# Patient Record
Sex: MALE | Race: WHITE | Employment: UNEMPLOYED | URBAN - METROPOLITAN AREA
[De-identification: names, ages, dates, MRNs, and addresses within clinical notes are randomized per-mention and may not be internally consistent; named-entity substitution may affect disease eponyms.]

---

## 2017-03-30 DIAGNOSIS — E78.5 HYPERLIPIDEMIA LDL GOAL <130: ICD-10-CM

## 2017-03-30 RX ORDER — SIMVASTATIN 40 MG
20 TABLET ORAL AT BEDTIME
Qty: 15 TABLET | Refills: 0 | Status: SHIPPED | OUTPATIENT
Start: 2017-03-30 | End: 2017-04-05

## 2017-04-03 DIAGNOSIS — E78.5 HYPERLIPIDEMIA LDL GOAL <130: ICD-10-CM

## 2017-04-04 RX ORDER — SIMVASTATIN 40 MG
TABLET ORAL
Qty: 45 TABLET | Refills: 3 | OUTPATIENT
Start: 2017-04-04

## 2017-04-05 DIAGNOSIS — E78.5 HYPERLIPIDEMIA LDL GOAL <130: ICD-10-CM

## 2017-04-05 NOTE — TELEPHONE ENCOUNTER
Please resend, [facility] states they have no current RX on file or no new one.    Thank you    Soila Hernandez MA

## 2017-04-06 RX ORDER — SIMVASTATIN 40 MG
20 TABLET ORAL AT BEDTIME
Qty: 15 TABLET | Refills: 0 | Status: SHIPPED | OUTPATIENT
Start: 2017-04-06 | End: 2017-05-04

## 2017-05-02 DIAGNOSIS — E78.5 HYPERLIPIDEMIA LDL GOAL <130: ICD-10-CM

## 2017-05-02 RX ORDER — SIMVASTATIN 40 MG
20 TABLET ORAL AT BEDTIME
Qty: 15 TABLET | Refills: 0 | Status: CANCELLED | OUTPATIENT
Start: 2017-05-02

## 2017-05-02 NOTE — TELEPHONE ENCOUNTER
Greater than 1 year since last office visit and labs for lipids. Last refill given for one month. No pending apts. To provider. Sarah Calderon RN

## 2017-05-03 DIAGNOSIS — I10 HYPERTENSION GOAL BP (BLOOD PRESSURE) < 140/90: Primary | ICD-10-CM

## 2017-05-03 DIAGNOSIS — E78.5 HYPERLIPIDEMIA LDL GOAL <130: ICD-10-CM

## 2017-05-03 NOTE — TELEPHONE ENCOUNTER
Reason for Call:  Medication or medication refill:    Do you use a [facility]?  Name of the pharmacy and phone number for the current request:  [facility]    Name of the medication requested: simvastatin (ZOCOR) 40 MG tablet    Other request: Patient stated that there was a Posmetric[employer] message sent to patient but patient is unable to check mychart. If there is something that patient needs to do please call to discuss.  Thank you    Can we leave a detailed message on this number? YES    Phone number patient can be reached at: Home number on file [phone] (home)    Best Time: ANYTIME    Call taken on 5/3/2017 at 4:38 PM by Nemesio Corbin

## 2017-05-04 RX ORDER — SIMVASTATIN 40 MG
TABLET ORAL
OUTPATIENT
Start: 2017-05-04

## 2017-05-04 RX ORDER — METOPROLOL SUCCINATE 50 MG/1
50 TABLET, EXTENDED RELEASE ORAL DAILY
Qty: 30 TABLET | Refills: 0 | Status: SHIPPED | OUTPATIENT
Start: 2017-05-04 | End: 2017-05-25

## 2017-05-04 RX ORDER — SIMVASTATIN 40 MG
20 TABLET ORAL AT BEDTIME
Qty: 15 TABLET | Refills: 0 | Status: SHIPPED | OUTPATIENT
Start: 2017-05-04 | End: 2017-05-25

## 2017-05-04 NOTE — TELEPHONE ENCOUNTER
Sreedhar Rich MD     3:25 PM  zocor denied. Need to be seen Sreedhar Rich MD    TC, please assist patient with appointment.     Daisha Monzon RN

## 2017-05-04 NOTE — TELEPHONE ENCOUNTER
Called and spoke to patient's wife. Lab and provider appointment made. Can you please refill until his appt on 5/25/17.    Please review lab orders and sign. Ching Welch MA/MARCY    Please alvaor for TC again so I can inform if RX is approved.

## 2017-05-13 DIAGNOSIS — I10 HYPERTENSION GOAL BP (BLOOD PRESSURE) < 140/90: ICD-10-CM

## 2017-05-13 DIAGNOSIS — E78.5 HYPERLIPIDEMIA LDL GOAL <130: ICD-10-CM

## 2017-05-13 PROCEDURE — 80061 LIPID PANEL: CPT | Performed by: FAMILY MEDICINE

## 2017-05-13 PROCEDURE — 36415 COLL VENOUS BLD VENIPUNCTURE: CPT | Performed by: FAMILY MEDICINE

## 2017-05-13 PROCEDURE — 80053 COMPREHEN METABOLIC PANEL: CPT | Performed by: FAMILY MEDICINE

## 2017-05-15 LAB
ALBUMIN SERPL-MCNC: 4 G/DL (ref 3.4–5)
ALP SERPL-CCNC: 77 U/L (ref 40–150)
ALT SERPL W P-5'-P-CCNC: 50 U/L (ref 0–70)
ANION GAP SERPL CALCULATED.3IONS-SCNC: 11 MMOL/L (ref 3–14)
AST SERPL W P-5'-P-CCNC: 30 U/L (ref 0–45)
BILIRUB SERPL-MCNC: 0.5 MG/DL (ref 0.2–1.3)
BUN SERPL-MCNC: 12 MG/DL (ref 7–30)
CALCIUM SERPL-MCNC: 8.8 MG/DL (ref 8.5–10.1)
CHLORIDE SERPL-SCNC: 105 MMOL/L (ref 94–109)
CHOLEST SERPL-MCNC: 207 MG/DL
CO2 SERPL-SCNC: 23 MMOL/L (ref 20–32)
CREAT SERPL-MCNC: 0.78 MG/DL (ref 0.66–1.25)
GFR SERPL CREATININE-BSD FRML MDRD: NORMAL ML/MIN/1.7M2
GLUCOSE SERPL-MCNC: 97 MG/DL (ref 70–99)
HDLC SERPL-MCNC: 45 MG/DL
LDLC SERPL CALC-MCNC: 102 MG/DL
NONHDLC SERPL-MCNC: 162 MG/DL
POTASSIUM SERPL-SCNC: 4.1 MMOL/L (ref 3.4–5.3)
PROT SERPL-MCNC: 7.8 G/DL (ref 6.8–8.8)
SODIUM SERPL-SCNC: 139 MMOL/L (ref 133–144)
TRIGL SERPL-MCNC: 301 MG/DL

## 2017-05-25 ENCOUNTER — OFFICE VISIT (OUTPATIENT)
Dept: FAMILY MEDICINE | Facility: CLINIC | Age: 52
End: 2017-05-25
Payer: COMMERCIAL

## 2017-05-25 VITALS
TEMPERATURE: 98 F | DIASTOLIC BLOOD PRESSURE: 89 MMHG | SYSTOLIC BLOOD PRESSURE: 139 MMHG | HEIGHT: 68 IN | WEIGHT: 247 LBS | HEART RATE: 83 BPM | BODY MASS INDEX: 37.44 KG/M2 | OXYGEN SATURATION: 96 %

## 2017-05-25 DIAGNOSIS — E78.5 HYPERLIPIDEMIA LDL GOAL <130: ICD-10-CM

## 2017-05-25 DIAGNOSIS — I10 HYPERTENSION GOAL BP (BLOOD PRESSURE) < 140/90: ICD-10-CM

## 2017-05-25 PROCEDURE — 99214 OFFICE O/P EST MOD 30 MIN: CPT | Performed by: FAMILY MEDICINE

## 2017-05-25 RX ORDER — SIMVASTATIN 40 MG
20 TABLET ORAL AT BEDTIME
Qty: 45 TABLET | Refills: 3 | Status: SHIPPED | OUTPATIENT
Start: 2017-05-25 | End: 2018-06-04

## 2017-05-25 RX ORDER — METOPROLOL SUCCINATE 50 MG/1
50 TABLET, EXTENDED RELEASE ORAL DAILY
Qty: 90 TABLET | Refills: 3 | Status: SHIPPED | OUTPATIENT
Start: 2017-05-25 | End: 2018-06-25
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
## 2017-05-25 NOTE — MR AVS SNAPSHOT
"              After Visit Summary   5/25/2017    Reece Washington    MRN: [id number]           Patient Information     Date Of Birth          1965        Visit Information        Provider Department      5/25/2017 7:20 AM Sreedhar Rich MD [facility]        Today's Diagnoses     Hyperlipidemia LDL goal <130        Hypertension goal BP (blood pressure) < 140/90           Follow-ups after your visit        Who to contact     If you have questions or need follow up information about today's clinic visit or your schedule please contact [facility] directly at [phone].  Normal or non-critical lab and imaging results will be communicated to you by MyChart, letter or phone within 4 business days after the clinic has received the results. If you do not hear from us within 7 days, please contact the clinic through MyChart or phone. If you have a critical or abnormal lab result, we will notify you by phone as soon as possible.  Submit refill requests through [employer] or call your pharmacy and they will forward the refill request to us. Please allow 3 business days for your refill to be completed.          Additional Information About Your Visit        Care EveryWhere ID     This is your Care EveryWhere ID. This could be used by other organizations to access your [place] medical records  [id number]        Your Vitals Were     Pulse Temperature Height Pulse Oximetry BMI (Body Mass Index)       83 98  F (36.7  C) (Oral) 5' 8\" (1.727 m) 96% 37.56 kg/m2        Blood Pressure from Last 3 Encounters:   05/25/17 139/89   06/21/16 135/83   01/26/16 137/89    Weight from Last 3 Encounters:   05/25/17 247 lb (112 kg)   06/21/16 249 lb (112.9 kg)   01/26/16 250 lb (113.4 kg)              Today, you had the following     No orders found for display         Today's Medication Changes          These changes are accurate as of: 5/25/17  7:33 AM.  If you have any questions, ask your nurse or doctor.    "            These medicines have changed or have updated prescriptions.        Dose/Directions    simvastatin 40 MG tablet   Commonly known as:  ZOCOR   This may have changed:  additional instructions   Used for:  Hyperlipidemia LDL goal <130   Changed by:  Sreedhar Rich MD        Dose:  20 mg   Take 0.5 tablets (20 mg) by mouth At Bedtime For cholesterol   Quantity:  45 tablet   Refills:  3            Where to get your medicines      These medications were sent to [id number] - [place], [place] - [address] [facility]  [address] [facility], [place] [facility] [place] [facility] [place] [address]    Hours:  test fax successful 4/5/04 kr Phone:  [phone]     metoprolol 50 MG 24 hr tablet    simvastatin 40 MG tablet                Primary Care Provider Office Phone # Fax #    Sreedhar Rich [phone]       [facility] [address] [facility] [address]        Thank you!     Thank you for choosing [facility]  for your care. Our goal is always to provide you with excellent care. Hearing back from our patients is one way we can continue to improve our services. Please take a few minutes to complete the written survey that you may receive in the mail after your visit with us. Thank you!             Your Updated Medication List - Protect others around you: Learn how to safely use, store and throw away your medicines at www.disposemymeds.org.          This list is accurate as of: 5/25/17  7:33 AM.  Always use your most recent med list.                   Brand Name Dispense Instructions for use    aspirin 81 MG tablet     1 tablet    Take 1 tablet (81 mg) by mouth daily       fish oil-omega-3 fatty acids 1000 MG capsule      Take 2 g by mouth daily.       metoprolol 50 MG 24 hr tablet    TOPROL-XL    90 tablet    Take 1 tablet (50 mg) by mouth daily Take in AM for blood pressure       MULTIVITAMIN TABS   OR      1 TABLET DAILY       propylene glycol 0.6 % Soln ophthalmic solution    SYSTANE  BALANCE     Apply 1 drop to eye 3 times daily.       simvastatin 40 MG tablet    ZOCOR    45 tablet    Take 0.5 tablets (20 mg) by mouth At Bedtime For cholesterol

## 2017-05-25 NOTE — NURSING NOTE
"Chief Complaint   Patient presents with     Lipids       Initial /89  Pulse 83  Temp 98  F (36.7  C) (Oral)  Ht 5' 8\" (1.727 m)  Wt 247 lb (112 kg)  SpO2 96%  BMI 37.56 kg/m2 Estimated body mass index is 37.56 kg/(m^2) as calculated from the following:    Height as of this encounter: 5' 8\" (1.727 m).    Weight as of this encounter: 247 lb (112 kg).  Medication Reconciliation: complete   Natasha Johns, YEVGENIY    "

## 2017-07-03 ENCOUNTER — TELEPHONE (OUTPATIENT)
Dept: FAMILY MEDICINE | Facility: CLINIC | Age: 52
End: 2017-07-03

## 2017-07-03 ENCOUNTER — RADIANT APPOINTMENT (OUTPATIENT)
Dept: GENERAL RADIOLOGY | Facility: CLINIC | Age: 52
End: 2017-07-03
Attending: FAMILY MEDICINE
Payer: COMMERCIAL

## 2017-07-03 ENCOUNTER — OFFICE VISIT (OUTPATIENT)
Dept: FAMILY MEDICINE | Facility: CLINIC | Age: 52
End: 2017-07-03
Payer: COMMERCIAL

## 2017-07-03 VITALS
BODY MASS INDEX: 36.95 KG/M2 | HEART RATE: 96 BPM | TEMPERATURE: 98.7 F | WEIGHT: 243 LBS | DIASTOLIC BLOOD PRESSURE: 77 MMHG | SYSTOLIC BLOOD PRESSURE: 133 MMHG | OXYGEN SATURATION: 96 %

## 2017-07-03 DIAGNOSIS — R10.13 EPIGASTRIC PAIN: Primary | ICD-10-CM

## 2017-07-03 DIAGNOSIS — R10.13 EPIGASTRIC PAIN: ICD-10-CM

## 2017-07-03 DIAGNOSIS — K21.00 GASTROESOPHAGEAL REFLUX DISEASE WITH ESOPHAGITIS: ICD-10-CM

## 2017-07-03 PROCEDURE — 71020 XR CHEST 2 VW: CPT

## 2017-07-03 PROCEDURE — 93000 ELECTROCARDIOGRAM COMPLETE: CPT | Performed by: FAMILY MEDICINE

## 2017-07-03 PROCEDURE — 99214 OFFICE O/P EST MOD 30 MIN: CPT | Performed by: FAMILY MEDICINE

## 2017-07-03 NOTE — TELEPHONE ENCOUNTER
Discussed with Dr. Rich who advises ok for pt to take once or twice daily as needed for heartburn.  Pharmacy notified.  Natasha Way RN

## 2017-07-03 NOTE — TELEPHONE ENCOUNTER
Spouse is calling stating pharmacy [facility] needs dosage for medication patient was just seen for. Spouse is also requesting a call when it is sent to pharmacy so she can . Thank you.

## 2017-07-03 NOTE — NURSING NOTE
"Chief Complaint   Patient presents with     throat     feeling like something is stuck in throat/ chest. feels like an air bubble that needs to come up. since last night. Started after he ate. vomiting about 4 times       Initial BP (!) 153/93  Pulse 96  Temp 98.7  F (37.1  C) (Oral)  Wt 243 lb (110.2 kg)  SpO2 96%  BMI 36.95 kg/m2 Estimated body mass index is 36.95 kg/(m^2) as calculated from the following:    Height as of 5/25/17: 5' 8\" (1.727 m).    Weight as of this encounter: 243 lb (110.2 kg).  Medication Reconciliation: complete     Shonna Fernandes cma      "

## 2017-07-03 NOTE — TELEPHONE ENCOUNTER
Reason for Call:  Medication or medication refill:    Do you use a [facility]?  Name of the pharmacy and phone number for the current request:  [facility] [phone]    Name of the medication requested: Omeprazole    Other request: directions state 1 tablet twice a day for 7 days then 'as needed' They need clarification on the 'as needed' part    Can we leave a detailed message on this number? YES    Phone number patient can be reached at: Other phone number:  See above    Best Time: any    Call taken on 7/3/2017 at 10:44 AM by Disha Calderon

## 2017-07-03 NOTE — MR AVS SNAPSHOT
After Visit Summary   7/3/2017    Reece Washington    MRN: [id number]           Patient Information     Date Of Birth          1965        Visit Information        Provider Department      7/3/2017 9:55 AM Sreedhar Rich MD [facility]        Today's Diagnoses     Epigastric pain    -  1    Gastroesophageal reflux disease with esophagitis           Follow-ups after your visit        Who to contact     If you have questions or need follow up information about today's clinic visit or your schedule please contact [facility] directly at [phone].  Normal or non-critical lab and imaging results will be communicated to you by MyChart, letter or phone within 4 business days after the clinic has received the results. If you do not hear from us within 7 days, please contact the clinic through MyChart or phone. If you have a critical or abnormal lab result, we will notify you by phone as soon as possible.  Submit refill requests through [employer] or call your pharmacy and they will forward the refill request to us. Please allow 3 business days for your refill to be completed.          Additional Information About Your Visit        Care EveryWhere ID     This is your Care EveryWhere ID. This could be used by other organizations to access your [place] medical records  [id number]        Your Vitals Were     Pulse Temperature Pulse Oximetry BMI (Body Mass Index)          96 98.7  F (37.1  C) (Oral) 96% 36.95 kg/m2         Blood Pressure from Last 3 Encounters:   07/03/17 133/77   05/25/17 139/89   06/21/16 135/83    Weight from Last 3 Encounters:   07/03/17 243 lb (110.2 kg)   05/25/17 247 lb (112 kg)   06/21/16 249 lb (112.9 kg)              We Performed the Following     EKG 12-lead complete w/read - Clinics          Today's Medication Changes          These changes are accurate as of: 7/3/17 10:42 AM.  If you have any questions, ask your nurse or doctor.               Start  taking these medicines.        Dose/Directions    omeprazole 20 MG CR capsule   Commonly known as:  priLOSEC   Used for:  Gastroesophageal reflux disease with esophagitis   Started by:  Sreedhar Rich MD        One tab twice daily x7 days, then as needed for heartburn.   Quantity:  90 capsule   Refills:  0            Where to get your medicines      These medications were sent to [id number] - [place], [place] - [address] [facility]  [address] [address], [place] [address]    Hours:  test fax successful 4/5/04 kr Phone:  [phone]     omeprazole 20 MG CR capsule                Primary Care Provider Office Phone # Fax #    Sreedhar Rich [phone]       [facility] [address] [facility] [address]        Equal Access to Services     XIAO CEDILLO : Hadii tana arizmendi hadasho Soomaali, waaxda luqadaha, qaybta kaalmada adeegyada, waxay darrickin hayheather fisher . So [facility] [phone].    ATENCIÓN: Si habla español, tiene a monreal disposición servicios gratuitos de asistencia lingüística. Llame al [phone].    We comply with applicable federal civil rights laws and [place] laws. We do not discriminate on the basis of race, color, national origin, age, disability sex, sexual orientation or gender identity.            Thank you!     Thank you for choosing [facility]  for your care. Our goal is always to provide you with excellent care. Hearing back from our patients is one way we can continue to improve our services. Please take a few minutes to complete the written survey that you may receive in the mail after your visit with us. Thank you!             Your Updated Medication List - Protect others around you: Learn how to safely use, store and throw away your medicines at www.disposemymeds.org.          This list is accurate as of: 7/3/17 10:42 AM.  Always use your most recent med list.                   Brand Name Dispense Instructions for use  Diagnosis    aspirin 81 MG tablet     1 tablet    Take 1 tablet (81 mg) by mouth daily        fish oil-omega-3 fatty acids 1000 MG capsule      Take 2 g by mouth daily.        metoprolol 50 MG 24 hr tablet    TOPROL-XL    90 tablet    Take 1 tablet (50 mg) by mouth daily Take in AM for blood pressure    Hypertension goal BP (blood pressure) < 140/90       MULTIVITAMIN TABS   OR      1 TABLET DAILY        omeprazole 20 MG CR capsule    priLOSEC    90 capsule    One tab twice daily x7 days, then as needed for heartburn.    Gastroesophageal reflux disease with esophagitis       propylene glycol 0.6 % Soln ophthalmic solution    SYSTANE BALANCE     Apply 1 drop to eye 3 times daily.    Dry eyes       simvastatin 40 MG tablet    ZOCOR    45 tablet    Take 0.5 tablets (20 mg) by mouth At Bedtime For cholesterol    Hyperlipidemia LDL goal <130
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
## 2018-06-04 DIAGNOSIS — E78.5 HYPERLIPIDEMIA LDL GOAL <130: ICD-10-CM

## 2018-06-04 NOTE — LETTER
June 7, 2018    Reece Washington  [address] [address] [facility] [address]    Dear Reece,       We recently received a refill request for simvastatin (ZOCOR) 40 MG tablet  .  We have refilled this for a one time 30 day supply only because you are due for a:    lab and provider appt for cholesterol       Please call at your earliest convenience so that there will not be a delay with your future refills.          Thank you,   Your [facility] Team  [phone]

## 2018-06-07 RX ORDER — SIMVASTATIN 40 MG
TABLET ORAL
Qty: 15 TABLET | Refills: 0 | Status: SHIPPED | OUTPATIENT
Start: 2018-06-07 | End: 2018-06-25

## 2018-06-07 NOTE — TELEPHONE ENCOUNTER
Medication is being filled for 1 time refill only due to:  pt needs lab and provider appt for cholesterol        - please send letter  Theresa JORDANN, RN, CPN

## 2018-06-11 ENCOUNTER — TELEPHONE (OUTPATIENT)
Dept: FAMILY MEDICINE | Facility: CLINIC | Age: 53
End: 2018-06-11

## 2018-06-11 DIAGNOSIS — E78.5 HYPERLIPIDEMIA LDL GOAL <130: ICD-10-CM

## 2018-06-11 DIAGNOSIS — Z12.5 SPECIAL SCREENING FOR MALIGNANT NEOPLASM OF PROSTATE: ICD-10-CM

## 2018-06-11 DIAGNOSIS — I10 HYPERTENSION GOAL BP (BLOOD PRESSURE) < 140/90: Primary | ICD-10-CM

## 2018-06-11 NOTE — TELEPHONE ENCOUNTER
Wife is calling regarding patient. Patient needs lab order for cholesterol, patient as an lab appointment for 6.16.19

## 2018-06-11 NOTE — TELEPHONE ENCOUNTER
Please review lab orders sign and close encounter. Ching Welch MA/MARCY    Lab only appt 6/16/18-left message that a provider appt is needed also.

## 2018-06-19 DIAGNOSIS — E78.5 HYPERLIPIDEMIA LDL GOAL <130: ICD-10-CM

## 2018-06-19 DIAGNOSIS — I10 HYPERTENSION GOAL BP (BLOOD PRESSURE) < 140/90: ICD-10-CM

## 2018-06-19 DIAGNOSIS — Z12.5 SPECIAL SCREENING FOR MALIGNANT NEOPLASM OF PROSTATE: ICD-10-CM

## 2018-06-19 LAB
ALBUMIN SERPL-MCNC: 3.9 G/DL (ref 3.4–5)
ANION GAP SERPL CALCULATED.3IONS-SCNC: 12 MMOL/L (ref 3–14)
BUN SERPL-MCNC: 13 MG/DL (ref 7–30)
CALCIUM SERPL-MCNC: 9 MG/DL (ref 8.5–10.1)
CHLORIDE SERPL-SCNC: 106 MMOL/L (ref 94–109)
CHOLEST SERPL-MCNC: 187 MG/DL
CO2 SERPL-SCNC: 21 MMOL/L (ref 20–32)
CREAT SERPL-MCNC: 0.77 MG/DL (ref 0.66–1.25)
GFR SERPL CREATININE-BSD FRML MDRD: >90 ML/MIN/1.7M2
GLUCOSE SERPL-MCNC: 95 MG/DL (ref 70–99)
HDLC SERPL-MCNC: 32 MG/DL
LDLC SERPL CALC-MCNC: ABNORMAL MG/DL
LDLC SERPL DIRECT ASSAY-MCNC: 96 MG/DL
NONHDLC SERPL-MCNC: 155 MG/DL
PHOSPHATE SERPL-MCNC: 3.1 MG/DL (ref 2.5–4.5)
POTASSIUM SERPL-SCNC: 4.2 MMOL/L (ref 3.4–5.3)
PSA SERPL-ACNC: 5.31 UG/L (ref 0–4)
SODIUM SERPL-SCNC: 139 MMOL/L (ref 133–144)
TRIGL SERPL-MCNC: 440 MG/DL

## 2018-06-19 PROCEDURE — 80069 RENAL FUNCTION PANEL: CPT | Performed by: FAMILY MEDICINE

## 2018-06-19 PROCEDURE — G0103 PSA SCREENING: HCPCS | Performed by: FAMILY MEDICINE

## 2018-06-19 PROCEDURE — 83721 ASSAY OF BLOOD LIPOPROTEIN: CPT | Mod: 59 | Performed by: FAMILY MEDICINE

## 2018-06-19 PROCEDURE — 36415 COLL VENOUS BLD VENIPUNCTURE: CPT | Performed by: FAMILY MEDICINE

## 2018-06-19 PROCEDURE — 80061 LIPID PANEL: CPT | Performed by: FAMILY MEDICINE

## 2018-06-25 ENCOUNTER — OFFICE VISIT (OUTPATIENT)
Dept: FAMILY MEDICINE | Facility: CLINIC | Age: 53
End: 2018-06-25
Payer: COMMERCIAL

## 2018-06-25 VITALS
SYSTOLIC BLOOD PRESSURE: 140 MMHG | WEIGHT: 251 LBS | BODY MASS INDEX: 38.04 KG/M2 | HEART RATE: 82 BPM | OXYGEN SATURATION: 97 % | TEMPERATURE: 98.2 F | RESPIRATION RATE: 16 BRPM | DIASTOLIC BLOOD PRESSURE: 91 MMHG | HEIGHT: 68 IN

## 2018-06-25 DIAGNOSIS — E66.01 MORBID OBESITY (H): ICD-10-CM

## 2018-06-25 DIAGNOSIS — E78.5 HYPERLIPIDEMIA LDL GOAL <130: ICD-10-CM

## 2018-06-25 DIAGNOSIS — I10 HYPERTENSION GOAL BP (BLOOD PRESSURE) < 140/90: Primary | ICD-10-CM

## 2018-06-25 DIAGNOSIS — K21.00 GASTROESOPHAGEAL REFLUX DISEASE WITH ESOPHAGITIS: ICD-10-CM

## 2018-06-25 PROCEDURE — 99213 OFFICE O/P EST LOW 20 MIN: CPT | Performed by: FAMILY MEDICINE

## 2018-06-25 RX ORDER — SIMVASTATIN 40 MG
20 TABLET ORAL AT BEDTIME
Qty: 45 TABLET | Refills: 2 | Status: SHIPPED | OUTPATIENT
Start: 2018-06-25 | End: 2019-04-15

## 2018-06-25 RX ORDER — METOPROLOL SUCCINATE 50 MG/1
50 TABLET, EXTENDED RELEASE ORAL DAILY
Qty: 90 TABLET | Refills: 0 | Status: SHIPPED | OUTPATIENT
Start: 2018-06-25 | End: 2018-12-18

## 2018-06-25 ASSESSMENT — PAIN SCALES - GENERAL: PAINLEVEL: NO PAIN (0)

## 2018-06-25 NOTE — MR AVS SNAPSHOT
"              After Visit Summary   6/25/2018    Reece Washington    MRN: [id number]           Patient Information     Date Of Birth          1965        Visit Information        Provider Department      6/25/2018 8:00 AM Pawel Padilla MD [facility]        Today's Diagnoses     Hypertension goal BP (blood pressure) < 140/90    -  1    Hyperlipidemia LDL goal <130        Gastroesophageal reflux disease with esophagitis          Care Instructions      Baby Aspirin 81 mg daily.    Check blood pressure monthly.    Think about exercise.          Follow-ups after your visit        Who to contact     If you have questions or need follow up information about today's clinic visit or your schedule please contact [facility] directly at [phone].  Normal or non-critical lab and imaging results will be communicated to you by MyChart, letter or phone within 4 business days after the clinic has received the results. If you do not hear from us within 7 days, please contact the clinic through MyChart or phone. If you have a critical or abnormal lab result, we will notify you by phone as soon as possible.  Submit refill requests through [facility] or call your pharmacy and they will forward the refill request to us. Please allow 3 business days for your refill to be completed.          Additional Information About Your Visit        Care EveryWhere ID     This is your Care EveryWhere ID. This could be used by other organizations to access your [facility] medical records  [id number]        Your Vitals Were     Pulse Temperature Respirations Height Pulse Oximetry BMI (Body Mass Index)    82 98.2  F (36.8  C) (Oral) 16 5' 8\" (1.727 m) 97% 38.16 kg/m2       Blood Pressure from Last 3 Encounters:   06/25/18 (!) 140/91   07/03/17 133/77   05/25/17 139/89    Weight from Last 3 Encounters:   06/25/18 251 lb (113.9 kg)   07/03/17 243 lb (110.2 kg)   05/25/17 247 lb (112 kg)              Today, you had the " following     No orders found for display         Today's Medication Changes          These changes are accurate as of 6/25/18  8:15 AM.  If you have any questions, ask your nurse or doctor.               These medicines have changed or have updated prescriptions.        Dose/Directions    simvastatin 40 MG tablet   Commonly known as:  ZOCOR   This may have changed:  See the new instructions.   Used for:  Hyperlipidemia LDL goal <130   Changed by:  Pawel Padilla MD        Dose:  20 mg   Take 0.5 tablets (20 mg) by mouth At Bedtime   Quantity:  45 tablet   Refills:  2            Where to get your medicines      These medications were sent to [id number] - [place], [place] - [address]  [address] [address] [place] [address]    Hours:  test fax successful 4/5/04 kr Phone:  [phone]     metoprolol succinate 50 MG 24 hr tablet    omeprazole 20 MG CR capsule    simvastatin 40 MG tablet                Primary Care Provider Office Phone # Fax #    Sreedhar Rich [phone] [address] [facility] [address]        Equal Access to Services     [facility]: Hadii aad ku hadasho Soomaali, waaxda luqadaha, qaybta kaalmada adeegyada, giovany fisher . So [facility] [phone].    ATENCIÓN: Si habla español, tiene a monreal disposición servicios gratuitos de asistencia lingüística. [facility] [phone].    We comply with applicable federal civil rights laws and [place] laws. We do not discriminate on the basis of race, color, national origin, age, disability, sex, sexual orientation, or gender identity.            Thank you!     Thank you for choosing [facility]  for your care. Our goal is always to provide you with excellent care. Hearing back from our patients is one way we can continue to improve our services. Please take a few minutes to complete the written survey that you may receive in the mail after your visit with us. Thank  you!             Your Updated Medication List - Protect others around you: Learn how to safely use, store and throw away your medicines at www.disposemymeds.org.          This list is accurate as of 6/25/18  8:15 AM.  Always use your most recent med list.                   Brand Name Dispense Instructions for use Diagnosis    aspirin 81 MG tablet     1 tablet    Take 1 tablet (81 mg) by mouth daily        fish oil-omega-3 fatty acids 1000 MG capsule      Take 2 g by mouth daily.        metoprolol succinate 50 MG 24 hr tablet    TOPROL-XL    90 tablet    Take 1 tablet (50 mg) by mouth daily Take in AM for blood pressure    Hypertension goal BP (blood pressure) < 140/90       MULTIVITAMIN TABS   OR      1 TABLET DAILY        omeprazole 20 MG CR capsule    priLOSEC    90 capsule    One tab twice daily x7 days, then as needed for heartburn.    Gastroesophageal reflux disease with esophagitis       propylene glycol 0.6 % Soln ophthalmic solution    SYSTANE BALANCE     Apply 1 drop to eye 3 times daily.    Dry eyes       simvastatin 40 MG tablet    ZOCOR    45 tablet    Take 0.5 tablets (20 mg) by mouth At Bedtime    Hyperlipidemia LDL goal <130

## 2018-06-25 NOTE — NURSING NOTE
"Chief Complaint   Patient presents with     Lipids     go over lab results     Health Maintenance     orders pended, PHQ-2       Initial BP (!) 140/91  Pulse 82  Temp 98.2  F (36.8  C) (Oral)  Resp 16  Ht 5' 8\" (1.727 m)  Wt 251 lb (113.9 kg)  SpO2 97%  BMI 38.16 kg/m2 Estimated body mass index is 38.16 kg/(m^2) as calculated from the following:    Height as of this encounter: 5' 8\" (1.727 m).    Weight as of this encounter: 251 lb (113.9 kg).  Medication Reconciliation: complete  Shonna Fernandes, YEVGENIY  "

## 2018-06-25 NOTE — PROGRESS NOTES
"CHIEF COMPLAINT    F/U BP, lipids.      HISTORY    Has been on same meds for years. Tolerating well. Omeprazole prn. Non smoker. Sedentary job. Has + FH for HD.    Drinks \"few beers\" nightly.    Patient Active Problem List   Diagnosis     Anxiety state     Hyperlipidemia LDL goal <130     Hypertension goal BP (blood pressure) < 140/90     Anal fissure     Dry eyes     GERD (gastroesophageal reflux disease)     AR (allergic rhinitis)     BMI 36.0-36.9,adult     Obesity     Elevated prostate specific antigen (PSA)     Current Outpatient Prescriptions   Medication Sig Dispense Refill     aspirin 81 MG tablet Take 1 tablet (81 mg) by mouth daily 1 tablet 0     fish oil-omega-3 fatty acids (FISH OIL) 1000 MG capsule Take 2 g by mouth daily.       metoprolol succinate (TOPROL-XL) 50 MG 24 hr tablet Take 1 tablet (50 mg) by mouth daily Take in AM for blood pressure 90 tablet 0     MULTIVITAMIN TABS   OR 1 TABLET DAILY       omeprazole (PRILOSEC) 20 MG CR capsule One tab twice daily x7 days, then as needed for heartburn. 90 capsule 1     propylene glycol (SYSTANE BALANCE) 0.6 % SOLN Apply 1 drop to eye 3 times daily.       simvastatin (ZOCOR) 40 MG tablet Take 0.5 tablets (20 mg) by mouth At Bedtime 45 tablet 2     [DISCONTINUED] metoprolol (TOPROL-XL) 50 MG 24 hr tablet Take 1 tablet (50 mg) by mouth daily Take in AM for blood pressure 90 tablet 3     [DISCONTINUED] simvastatin (ZOCOR) 40 MG tablet TAKE 0.5 TABLETS (20 MG) BY MOUTH AT BEDTIME FOR CHOLESTEROL 15 tablet 0       REVIEW OF SYSTEMS    Wt relatively stable  No SOB  No CP  No abd pain      Past Medical History:   Diagnosis Date     Back pain      Blunt eye trauma     as a teen, racket ball injury -hospitalized 1 week, patched both eyes,  thinks injurred left eye     Hypertension      Neck pain      Pure hypercholesterolemia        EXAM  BP (!) 140/91  Pulse 82  Temp 98.2  F (36.8  C) (Oral)  Resp 16  Ht 5' 8\" (1.727 m)  Wt 251 lb (113.9 kg)  SpO2 97%  BMI " 38.16 kg/m2    Neck: no thyromegaly  Chest: clear  CV: RSR w/o murmur  Abd protuberant  Legs: no edema      (I10) Hypertension goal BP (blood pressure) < 140/90  (primary encounter diagnosis)  Comment:   Plan: metoprolol succinate (TOPROL-XL) 50 MG 24 hr         tablet            (E78.5) Hyperlipidemia LDL goal <130  Comment:   Plan: simvastatin (ZOCOR) 40 MG tablet            (K21.0) Gastroesophageal reflux disease with esophagitis  Comment:   Plan: omeprazole (PRILOSEC) 20 MG CR capsule            (E66.01) Morbid obesity (H)  Comment:   Plan:

## 2018-09-12 ENCOUNTER — TELEPHONE (OUTPATIENT)
Dept: FAMILY MEDICINE | Facility: CLINIC | Age: 53
End: 2018-09-12

## 2018-09-12 NOTE — TELEPHONE ENCOUNTER
Panel Management Review      Patient has the following on his problem list:     Hypertension   Last three blood pressure readings:  BP Readings from Last 3 Encounters:   06/25/18 (!) 140/91   07/03/17 133/77   05/25/17 139/89     Blood pressure: FAILED    HTN Guidelines:  Age 18-59 BP range:  Less than 140/90  Age 60-85 with Diabetes:  Less than 140/90  Age 60-85 without Diabetes:  less than 150/90      Composite cancer screening  Chart review shows that this patient is due/due soon for the following None  Summary:    Patient is due/failing the following:   BP CHECK    Action needed:   Routed to provider for review.    Type of outreach:    None, routed to provider for review.    Questions for provider review:    When is he due for office visit for Blood Pressure? No plan                                                                                                                                     Natasha Johns CMA     Chart routed to Provider .

## 2018-09-12 NOTE — LETTER
Reece Washington  [address] [address]      September 12, 2018          Dear Reece,      Our records indicate that you have not scheduled for a(n)appointment with Sreedhar Rich MD, Fasting bloodwork and Blood pressure check  which was recommended by your health care team. Monitoring and managing your preventative and chronic health conditions are very important to us. Dr Rich would like to see you in the next month please.      If you have received your health care elsewhere, please provide us with that information so it can be documented in your chart.    Please call [phone] or message us through your [facility] account to schedule an appointment or provide information for your chart.     We look forward to seeing you and working with you on your health care needs.     Sincerely,   Sreedhar Rich MD/          *If you have already scheduled an appointment, please disregard this reminder

## 2018-09-12 NOTE — TELEPHONE ENCOUNTER
Yes I would like another md appointment in next month to recheck blood pressure /labs. Sreedhar Rich MD

## 2018-12-12 ENCOUNTER — ALLIED HEALTH/NURSE VISIT (OUTPATIENT)
Dept: URGENT CARE | Facility: URGENT CARE | Age: 53
End: 2018-12-12
Payer: COMMERCIAL

## 2018-12-12 VITALS
BODY MASS INDEX: 38.92 KG/M2 | HEART RATE: 74 BPM | WEIGHT: 248 LBS | OXYGEN SATURATION: 98 % | DIASTOLIC BLOOD PRESSURE: 102 MMHG | HEIGHT: 67 IN | SYSTOLIC BLOOD PRESSURE: 162 MMHG

## 2018-12-12 DIAGNOSIS — I10 HYPERTENSION, UNSPECIFIED TYPE: ICD-10-CM

## 2018-12-12 DIAGNOSIS — R20.0 NUMBNESS: ICD-10-CM

## 2018-12-12 DIAGNOSIS — R07.89 CHEST TIGHTNESS OR PRESSURE: Primary | ICD-10-CM

## 2018-12-12 PROCEDURE — 99215 OFFICE O/P EST HI 40 MIN: CPT | Performed by: INTERNAL MEDICINE

## 2018-12-12 PROCEDURE — 93000 ELECTROCARDIOGRAM COMPLETE: CPT | Performed by: INTERNAL MEDICINE

## 2018-12-12 ASSESSMENT — MIFFLIN-ST. JEOR: SCORE: 1918.55

## 2018-12-12 NOTE — NURSING NOTE
Patient was at chiropractor about 15 minutes ago. Blood pressure - 190/116.  No headache. No blurred vision.   Always feels a little lightheaded.  Hx of pain in neck, painful to turn neck  Patient experiencing and numbness on right side of body from neck to foot.  Onset of numbness comes and goes for several years but more persistent for past 3 weeks.  Patient does states he is experiencing pain in chest but injured ribs several years ago playing football, pains in chest comes and goes.    Theresa JORDANN, RN, CPN

## 2018-12-12 NOTE — PROGRESS NOTES
SUBJECTIVE:  Reece Washington is an 53 year old male who presents for elevated blood pressure.  Has h/o numbness on right side of body on neck, upper arm, chest, low back, and from hip down to foot.  Has had for about 15 years after having accident.  Recent sxs have been the same but occurring more often.  Used to be once or twice a month, but has had more in the past three weeks where will improve some but not resolve.  Went to chiropractor today to see if could help and they checked his BP twice and was high and told him to go see a doctor.  No new chest pain.  No shortness of breath.  Does get tightness in chest on and off for years, has been a little worse the past few days.  Is on metoprolol for blood pressure.  Has tried to stretch his arm and leg to help with numbness but not help.      PMH:   has a past medical history of Back pain, Blunt eye trauma, Hypertension, Neck pain, and Pure hypercholesterolemia.  Patient Active Problem List   Diagnosis     Anxiety state     Hyperlipidemia LDL goal <130     Hypertension goal BP (blood pressure) < 140/90     Anal fissure     Dry eyes     GERD (gastroesophageal reflux disease)     AR (allergic rhinitis)     BMI 36.0-36.9,adult     Obesity     Elevated prostate specific antigen (PSA)     Morbid obesity (H)     Social History     Socioeconomic History     Marital status:      Spouse name: Not on file     Number of children: Not on file     Years of education: Not on file     Highest education level: Not on file   Social Needs     Financial resource strain: Not on file     Food insecurity - worry: Not on file     Food insecurity - inability: Not on file     Transportation needs - medical: Not on file     Transportation needs - non-medical: Not on file   Occupational History     Not on file   Tobacco Use     Smoking status: Never Smoker     Smokeless tobacco: Never Used     Tobacco comment: Smoking hosuehold   Substance and Sexual Activity     Alcohol use: Yes      "Comment: Occasionally     Drug use: No     Sexual activity: Yes     Partners: Female   Other Topics Concern     Parent/sibling w/ CABG, MI or angioplasty before 65F 55M? Yes   Social History Narrative     Not on file     Family History   Problem Relation Age of Onset     Heart Disease Father         MI @ 55     Hypertension Father      Lipids Father      Cerebrovascular Disease Maternal Grandfather         62 years old     Hypertension Mother      Cancer Maternal Grandmother         pancreas     Diabetes No family hx of      Thyroid Disease No family hx of      Glaucoma No family hx of      Macular Degeneration No family hx of        ALLERGIES:  Patient has no known allergies.    Current Outpatient Medications   Medication     metoprolol succinate (TOPROL-XL) 50 MG 24 hr tablet     aspirin 81 MG tablet     fish oil-omega-3 fatty acids (FISH OIL) 1000 MG capsule     MULTIVITAMIN TABS   OR     omeprazole (PRILOSEC) 20 MG CR capsule     propylene glycol (SYSTANE BALANCE) 0.6 % SOLN     simvastatin (ZOCOR) 40 MG tablet     No current facility-administered medications for this visit.          ROS:  ROS is done and is negative for general/constitutional, eye, ENT, Respiratory, cardiovascular, GI, , Skin, musculoskeletal except as noted elsewhere.  All other review of systems negative except as noted elsewhere.      OBJECTIVE:  BP (!) initial /108, manual recheck 162/102   Pulse 74   Ht 1.702 m (5' 7\")   Wt 112.5 kg (248 lb)   SpO2 98%   BMI 38.84 kg/m    GENERAL APPEARANCE: Alert, in no acute distress, appears mildly anxious.  EYES: normal  NOSE:normal  OROPHARYNX:normal  NECK:No adenopathy,masses or thyromegaly  RESP: normal and clear to auscultation  CV:regular rate and rhythm and no murmurs, clicks, or gallops  CHEST WALL:non-tender  ABDOMEN: Abdomen soft, non-tender. BS normal. No masses, organomegaly  SKIN: no ulcers, lesions or rash  MUSCULOSKELETAL:Musculoskeletal normal  NEURO: CN 2-12 grossly intact. "  Strength 5/5 and symmetric in bilateral upper and lower extremities.  DTRs 2+ and symmetric in bilateral upper and lower extremities.  Sensation to light touch grossly intact in bilateral upper and lower extremities.      RESULTS  ekg - nsr, no significant st or t-wave changes noted, no significant changes from 7/2017  No results found for this or any previous visit (from the past 48 hour(s)).    ASSESSMENT/PLAN:    ASSESSMENT / PLAN:  (R07.89) Chest tightness or pressure  (primary encounter diagnosis)  Comment: chest pressure is concerning for possible cardiac etiology and has been worsening.  Also has elevated BP today and numbness  Plan: pt advised to go to ER as if cardiac etiology is cause of sxs, could become life threatening and he needs more evaluation immediately than can be provided here. Patient advised to go to the ER via ambulance. Discussed risks of not taking an ambulance including serious illness, serious complications occurring in route, accidents, and death.  Patient refused to be transported by ambulance.  Pt will drive self to the ER and staff have called [facility] ER to inform that pt is coming.      (I10) HTN (hypertension)  Comment: above goal. Having chest pressure also  Plan: EKG 12-lead complete w/read - Clinics        ER as above.    (R20.0) Numbness  Comment: has h/o numbness on right side which has chronically but is worse which may or may not be related to the chest pressure and htn  Plan: pt to got to ER as above.      See [facility] for orders, medications, letters, patient instructions    Lina Johnson M.D.

## 2018-12-14 ENCOUNTER — TELEPHONE (OUTPATIENT)
Dept: FAMILY MEDICINE | Facility: CLINIC | Age: 53
End: 2018-12-14

## 2018-12-14 NOTE — TELEPHONE ENCOUNTER
Went to [facility] for high blood pressure and back and neck pain.  Doctor put him on gabapentin and upped his metoprolol to 25ml in the morning and at night. Suggested to see a neurologist.  Wife would like to know where do we go from her.

## 2018-12-14 NOTE — TELEPHONE ENCOUNTER
There is consent to communicate with wife in chart.  ED discharge instructions say to follow up with pmd in 3-5 days.    Pt wife stating she knows that information but wants to know if we can just do a referral for the neurologist.  Advised an appointment would be needed due to needing to check his blood pressure also per ER provider instructions.  Pt wife stating we just want them to pay for another appointment and her  is going to lose his job due to the amount of time he is missing because no one will listen to him.  His pain is not controlled so he needs to see neurologist and have an MRI.  She states she needs to call around for prices of MRI's.  She would like to know where else other than [facility] they are done.  Advised [facility] does them and I gave her the consumer price line.    Pt wife agrees to appointment for pt stating we have them where we want them so they will have to come in for an appointment.  She states she is very unhappy we do not have Saturday appointment's.  I advised we do have evening appointment's but they usually schedule out a few weeks.  The latest appointment I can schedule on Monday or Tuesday was at 4:20 pm.  Appointment made.  Pt wife states she is very unhappy with our clinic and they will be looking for another one.  Advised to check with her insurance and it is ok to try another clinic.  Natasha JORDANN, RN

## 2018-12-18 ENCOUNTER — OFFICE VISIT (OUTPATIENT)
Dept: FAMILY MEDICINE | Facility: CLINIC | Age: 53
End: 2018-12-18
Payer: COMMERCIAL

## 2018-12-18 VITALS
WEIGHT: 246 LBS | BODY MASS INDEX: 38.53 KG/M2 | RESPIRATION RATE: 20 BRPM | OXYGEN SATURATION: 97 % | SYSTOLIC BLOOD PRESSURE: 156 MMHG | HEART RATE: 79 BPM | DIASTOLIC BLOOD PRESSURE: 89 MMHG | TEMPERATURE: 97.8 F

## 2018-12-18 DIAGNOSIS — R20.0 NUMBNESS ON RIGHT SIDE: ICD-10-CM

## 2018-12-18 DIAGNOSIS — M54.2 NECK PAIN ON RIGHT SIDE: Primary | ICD-10-CM

## 2018-12-18 DIAGNOSIS — I10 HYPERTENSION GOAL BP (BLOOD PRESSURE) < 140/90: ICD-10-CM

## 2018-12-18 PROCEDURE — 99214 OFFICE O/P EST MOD 30 MIN: CPT | Performed by: FAMILY MEDICINE

## 2018-12-18 RX ORDER — METOPROLOL SUCCINATE 50 MG/1
50 TABLET, EXTENDED RELEASE ORAL DAILY
Qty: 30 TABLET | Refills: 5 | Status: SHIPPED | OUTPATIENT
Start: 2018-12-18 | End: 2019-07-19

## 2018-12-18 RX ORDER — GABAPENTIN 100 MG/1
100 CAPSULE ORAL
COMMUNITY
Start: 2018-12-12 | End: 2018-12-18

## 2018-12-18 RX ORDER — GABAPENTIN 100 MG/1
100 CAPSULE ORAL 3 TIMES DAILY
COMMUNITY
Start: 2018-12-18 | End: 2018-12-18

## 2018-12-18 RX ORDER — METOPROLOL TARTRATE 25 MG/1
25 TABLET, FILM COATED ORAL
COMMUNITY
Start: 2018-12-12 | End: 2018-12-18 | Stop reason: ALTCHOICE

## 2018-12-18 RX ORDER — GABAPENTIN 100 MG/1
100 CAPSULE ORAL 3 TIMES DAILY
Qty: 90 CAPSULE | Refills: 2 | Status: SHIPPED | OUTPATIENT
Start: 2018-12-18 | End: 2019-05-20

## 2018-12-18 NOTE — PROGRESS NOTES
CHIEF COMPLAINT    F/U E R visit of 12-.  Elevated BP.        HISTORY    This patient returns after an E R visit.  He presented with hypertension but he also feels that he had some anxiety and had gotten himself worked up over some of his symptoms.    His symptoms include pain in the right side of the neck along with neck stiffness.  This pain radiates toward his right shoulder and right scapula.  This is a fairly long-standing process.  He did have a cervical MRI in 2009.  Lumbar MRI was performed at that time also.  He was felt to be nonsurgical.    The patient gets some pains in his mid chest, often at work.  He feels these may be due to anxiety.    Back on 12-12, he had this right-sided pain but it also extended down through his body and into his right leg down toward his knee.  He states that things felt kind of spongy underneath his skin.  This was not really numbness but may have been construed as such when he presented to the ER.  He initially presented to his chiropractor.  Both the Chiro and in the clinic noticed his blood pressure was elevated so he wound up in the ER.  His neurologic exam was intact.  He was asked to in increase his metoprolol.  He was taking one half of a Toprol-XL 50 mg daily and they asked him to take one half of those twice daily.  BP has improved today.    Also in the emergency room, it was felt that he possibly has cervical radiculopathy symptoms.  He was given an Rx for gabapentin 100 mg 3 times daily which she has been taking.  He reports some subjective improvement in symptoms.    He was able to clarify some of his symptoms with me today as I have noted above.    Patient further voices a dislike of taking medications.  He does not want anything for anxiety.  He would like to have things fixed without medication but I am not sure that is possible.      Patient Active Problem List   Diagnosis     Anxiety state     Hyperlipidemia LDL goal <130     Hypertension goal BP  (blood pressure) < 140/90     Anal fissure     Dry eyes     GERD (gastroesophageal reflux disease)     AR (allergic rhinitis)     BMI 36.0-36.9,adult     Obesity     Elevated prostate specific antigen (PSA)     Morbid obesity (H)       REVIEW OF SYSTEMS    Currently no fevers or chills.  No breathing problems.  Intermittent chest pains, not necessarily exertional.  No abdominal pain.  No focal weakness.  No speech problems.  No skin rashes.  A tendency of anxiety.      Past Medical History:   Diagnosis Date     Back pain      Blunt eye trauma     as a teen, racket ball injury -hospitalized 1 week, patched both eyes,  thinks injurred left eye     Hypertension      Neck pain      Pure hypercholesterolemia        EXAM  /89   Pulse 79   Temp 97.8  F (36.6  C) (Oral)   Resp 20   Wt 111.6 kg (246 lb)   SpO2 97%   BMI 38.53 kg/m      A large man with BMI 38.  NAD.  HEENT shows equal pupils, normal EOMs, symmetrical face.  Neck exam shows some tenderness when he leans his head to the left with tenderness being on the right side of his neck.  He does not have a positive compression test on the right.  Chest is clear.  Cardiac rhythm regular without murmurs or rubs.  Abdomen protuberant.  Motor and fine motor are normal no extremity drift normal gait.    I did review the MRIs of cervical and lumbar spine from 2009.  He has evidence of some degenerative disc disease and some arthritis and stenosis.  These studies are older but are somewhat suggestive.      (M54.2) Neck pain on right side  (primary encounter diagnosis)  Comment:     He may in fact be having some cervical radicular symptoms.  I suggested that he continue the gabapentin.  Consider neurology referral and this was made.  Another option would be to consider a referral to medical spine.      Plan: NEUROLOGY ADULT REFERRAL, gabapentin         (NEURONTIN) 100 MG capsule            (I10) Hypertension goal BP (blood pressure) < 140/90  Comment:     BP is much  improved today.  I suggested he simplify his metoprolol dose and simply take 50 mg of Toprol-XL in the morning.  Have follow-up with his own physician, Dr. Rich.  He does feel his blood pressure worsens with anxiety but does not want to take much more medication for anything.    Plan: NEUROLOGY ADULT REFERRAL, metoprolol succinate         ER (TOPROL-XL) 50 MG 24 hr tablet            (R20.0) Numbness on right side  Comment:     This is probably not pure numbness but he does have right-sided body symptoms.  Again neuro opinion was encouraged.    Plan: NEUROLOGY ADULT REFERRAL

## 2018-12-18 NOTE — PATIENT INSTRUCTIONS
See Neurologist.    See Dr Rich in 1-2 weeks.    Continue present medications.    Take Metoprolol 50 mg each morning.

## 2019-01-07 ENCOUNTER — TRANSFERRED RECORDS (OUTPATIENT)
Dept: HEALTH INFORMATION MANAGEMENT | Facility: CLINIC | Age: 54
End: 2019-01-07

## 2019-01-14 ENCOUNTER — TRANSFERRED RECORDS (OUTPATIENT)
Dept: HEALTH INFORMATION MANAGEMENT | Facility: CLINIC | Age: 54
End: 2019-01-14

## 2019-03-20 ENCOUNTER — TELEPHONE (OUTPATIENT)
Dept: FAMILY MEDICINE | Facility: CLINIC | Age: 54
End: 2019-03-20

## 2019-03-20 NOTE — LETTER
Reece Washington  [address] [address]          March 20, 2019          Dear Reece Washington      Our records indicate that you have not scheduled for a(n)appointment with Sreedhar Rich MD and Blood pressure check  which was recommended by your health care team. Monitoring and managing your preventative and chronic health conditions are very important to us.       If you have received your health care elsewhere, please provide us with that information so it can be documented in your chart.    Please call [phone] or message us through your [employer] account to schedule an appointment or provide information for your chart.     We look forward to seeing you and working with you on your health care needs.     Sincerely,   Sreedhar Rich MD          *If you have already scheduled an appointment, please disregard this reminder

## 2019-03-20 NOTE — TELEPHONE ENCOUNTER
Panel Management Review      Patient has the following on his problem list:     Hypertension   Last three blood pressure readings:  BP Readings from Last 3 Encounters:   12/18/18 156/89   12/12/18 (!) 162/102   06/25/18 (!) 140/91     Blood pressure: FAILED    HTN Guidelines:  Age 18-59 BP range:  Less than 140/90  Age 60-85 with Diabetes:  Less than 140/90  Age 60-85 without Diabetes:  less than 150/90      Composite cancer screening  Chart review shows that this patient is due/due soon for the following None  Summary:    Patient is due/failing the following:   BP CHECK    Action needed:   Patient needs office visit for Blood Pressure check .    Type of outreach:    Sent letter.    Questions for provider review:    None                                                                                                                                    Natasha Johns CMA     Chart routed to 0 .

## 2019-04-15 DIAGNOSIS — E78.5 HYPERLIPIDEMIA LDL GOAL <130: ICD-10-CM

## 2019-04-15 RX ORDER — SIMVASTATIN 40 MG
20 TABLET ORAL AT BEDTIME
Qty: 45 TABLET | Refills: 0 | Status: SHIPPED | OUTPATIENT
Start: 2019-04-15 | End: 2019-07-19

## 2019-04-26 ENCOUNTER — OFFICE VISIT (OUTPATIENT)
Dept: FAMILY MEDICINE | Facility: CLINIC | Age: 54
End: 2019-04-26
Payer: COMMERCIAL

## 2019-04-26 VITALS
RESPIRATION RATE: 20 BRPM | HEART RATE: 66 BPM | SYSTOLIC BLOOD PRESSURE: 144 MMHG | HEIGHT: 67 IN | OXYGEN SATURATION: 97 % | BODY MASS INDEX: 38.45 KG/M2 | WEIGHT: 245 LBS | TEMPERATURE: 98.1 F | DIASTOLIC BLOOD PRESSURE: 80 MMHG

## 2019-04-26 DIAGNOSIS — I10 HYPERTENSION GOAL BP (BLOOD PRESSURE) < 140/90: ICD-10-CM

## 2019-04-26 DIAGNOSIS — Z23 NEED FOR VACCINATION: Primary | ICD-10-CM

## 2019-04-26 DIAGNOSIS — R20.0 RIGHT ARM NUMBNESS: ICD-10-CM

## 2019-04-26 DIAGNOSIS — M54.2 NECK PAIN ON RIGHT SIDE: ICD-10-CM

## 2019-04-26 DIAGNOSIS — E78.5 HYPERLIPIDEMIA LDL GOAL <130: ICD-10-CM

## 2019-04-26 PROCEDURE — 90471 IMMUNIZATION ADMIN: CPT | Performed by: FAMILY MEDICINE

## 2019-04-26 PROCEDURE — 99214 OFFICE O/P EST MOD 30 MIN: CPT | Mod: 25 | Performed by: FAMILY MEDICINE

## 2019-04-26 PROCEDURE — 90715 TDAP VACCINE 7 YRS/> IM: CPT | Performed by: FAMILY MEDICINE

## 2019-04-26 RX ORDER — METOPROLOL SUCCINATE 100 MG/1
100 TABLET, EXTENDED RELEASE ORAL DAILY
Qty: 90 TABLET | Refills: 1 | Status: SHIPPED | OUTPATIENT
Start: 2019-04-26 | End: 2019-11-15

## 2019-04-26 ASSESSMENT — MIFFLIN-ST. JEOR: SCORE: 1904.94

## 2019-04-26 NOTE — NURSING NOTE
"Chief Complaint   Patient presents with     Hypertension     p2 adv      Numbness       Initial /80   Pulse 66   Temp 98.1  F (36.7  C) (Oral)   Resp 20   Ht 1.702 m (5' 7\")   Wt 111.1 kg (245 lb)   SpO2 97%   BMI 38.37 kg/m   Estimated body mass index is 38.37 kg/m  as calculated from the following:    Height as of this encounter: 1.702 m (5' 7\").    Weight as of this encounter: 111.1 kg (245 lb).    Natasha Johns CMA  Screening Questionnaire for Adult Immunization    Are you sick today?   No   Do you have allergies to medications, food, a vaccine component or latex?   No   Have you ever had a serious reaction after receiving a vaccination?   No   Do you have a long-term health problem with heart disease, lung disease, asthma, kidney disease, metabolic disease (e.g. diabetes), anemia, or other blood disorder?   No   Do you have cancer, leukemia, HIV/AIDS, or any other immune system problem?   No   In the past 3 months, have you taken medications that affect  your immune system, such as prednisone, other steroids, or anticancer drugs; drugs for the treatment of rheumatoid arthritis, Crohn s disease, or psoriasis; or have you had radiation treatments?   No   Have you had a seizure, or a brain or other nervous system problem?   No   During the past year, have you received a transfusion of blood or blood     products, or been given immune (gamma) globulin or antiviral drug?   No   For women: Are you pregnant or is there a chance you could become        pregnant during the next month?   No   Have you received any vaccinations in the past 4 weeks?   No     Immunization questionnaire answers were all negative.        Per orders of Dr. Rich, injection of tdap  given by Natasha Johns. Patient instructed to remain in clinic for 15 minutes afterwards, and to report any adverse reaction to me immediately.       Screening performed by Natasha Johns on 4/26/2019 at 2:39 PM.    "

## 2019-04-26 NOTE — PROGRESS NOTES
"SUBJECTIVE:  Reece Washington, a 53 year old male scheduled an appointment to discuss the following issues:  Need for vaccination  Follow-up blood pressure, chol check and obesity.   Numbness on right side/ he did go to a specialist / they thought it was his sleeping  Intermittent numbness right arm/leg. Seen in ed and ekg/trop ok. Given gabapentin in urgent care.  Possible anxiety issues too.   Outside blood pressure 150-140/80's.   No chest pain or shortness of breath. Stress test in past.   History gerd - prn prilosec helpful.   Stress - not interested in medications. No SUICIAL IDEATION OR HOMOCIDAL IDEATION OR ROSEANNE.  Home going ok.   No nausea, vomiting or diarrhea or black or bloody stools. No urine changes or hematuria.  Seen neurologist - wanted to due sleep study. MRI brain normal in winter and mri cervical/back 10 years ago mild herniated discs.   Gabapentin can be helpful. More numbness then pain. Went to chiropractor - didn't do much because of blood pressure.   Some snoring but no apnea. No breath-rite. ALCOHOL beer 3/nights.  Medical, social, surgical, and family histories reviewed.    ROS:  All other ROS negative    OBJECTIVE:  /80   Pulse 66   Temp 98.1  F (36.7  C) (Oral)   Resp 20   Ht 1.702 m (5' 7\")   Wt 111.1 kg (245 lb)   SpO2 97%   BMI 38.37 kg/m    EXAM:  GENERAL APPEARANCE: healthy, alert and no distress  EYES: EOMI,  PERRL  HENT: ear canals and TM's normal and nose and mouth without ulcers or lesions  NECK: no adenopathy, no asymmetry, masses, or scars and thyroid normal to palpation  NECK:tight nape muslces  RESP: lungs clear to auscultation - no rales, rhonchi or wheezes  CV: regular rates and rhythm, normal S1 S2, no S3 or S4 and no murmur, click or rub -  ABDOMEN:  soft, nontender, no HSM or masses and bowel sounds normal  MS: extremities normal- no gross deformities noted, no evidence of inflammation in joints, FROM in all extremities.  NEURO: Normal strength and tone, " sensory exam grossly normal, mentation intact and speech normal  PSYCH: mentation appears normal and affect normal/bright  PSYCH: mildly anxious    ASSESSMENT / PLAN:  (Z23) Need for vaccination  (primary encounter diagnosis)  Plan: TDAP VACCINE (ADACEL) [37192.002], 1st          Administration  [59514]            (R20.2) Right arm numbness  Comment: likely cervical related  Plan: ORTHOPEDICS ADULT REFERRAL        Follow-up neck specialist second opinion. Consider back to neurology too. Expected course and warning signs reviewed. Chest pain or shortness of breath to er.     (E78.5) Hyperlipidemia LDL goal <130  Comment: ok in past  Plan: weight loss/lower carbs and exercise.     (I10) Hypertension goal BP (blood pressure) < 140/90  Comment: a little high  Plan: metoprolol succinate ER (TOPROL-XL) 100 MG 24         hr tablet        Double toprol. Continue self-monitor. Consider hydrochlorothiazide if worse. Recheck in 6 months  Sooner if worse. Lower sodium in diet and exercise.  Consider sleep study. Limit ALCOHOL and try breathritie. .Call/email with questions/concerns  Consider anxiety meds too.     Sreedhar Rich MD

## 2019-04-26 NOTE — PROGRESS NOTES
blood pressure, chol check  Numbness on right side/ he did go to a specialist / they thought it was his sleeping

## 2019-05-20 DIAGNOSIS — M54.2 NECK PAIN ON RIGHT SIDE: ICD-10-CM

## 2019-05-20 RX ORDER — GABAPENTIN 100 MG/1
100 CAPSULE ORAL 3 TIMES DAILY
Qty: 90 CAPSULE | Refills: 2 | Status: SHIPPED | OUTPATIENT
Start: 2019-05-20 | End: 2020-01-14

## 2019-07-17 DIAGNOSIS — I10 HYPERTENSION GOAL BP (BLOOD PRESSURE) < 140/90: ICD-10-CM

## 2019-07-17 DIAGNOSIS — E78.5 HYPERLIPIDEMIA LDL GOAL <130: ICD-10-CM

## 2019-07-19 RX ORDER — SIMVASTATIN 40 MG
20 TABLET ORAL AT BEDTIME
Qty: 45 TABLET | Refills: 1 | Status: SHIPPED | OUTPATIENT
Start: 2019-07-19 | End: 2019-11-15

## 2019-07-19 RX ORDER — METOPROLOL SUCCINATE 50 MG/1
50 TABLET, EXTENDED RELEASE ORAL DAILY
Qty: 90 TABLET | Refills: 1 | Status: SHIPPED | OUTPATIENT
Start: 2019-07-19 | End: 2020-01-21

## 2019-07-19 NOTE — TELEPHONE ENCOUNTER
"Per last office visit 4/26/19, patient to follow up in 6 mos.  Patient is overdue for labs.    Please advise on refill.    Requested Prescriptions   Pending Prescriptions Disp Refills     metoprolol succinate ER (TOPROL-XL) 50 MG 24 hr tablet 30 tablet 5     Sig: Take 1 tablet (50 mg) by mouth daily Take in AM for blood pressure       Beta-Blockers Protocol Failed - 7/17/2019  7:57 PM        Failed - Blood pressure under 140/90 in past 12 months     BP Readings from Last 3 Encounters:   04/26/19 144/80   12/18/18 156/89   12/12/18 (!) 162/102                 Passed - Patient is age 6 or older        Passed - Recent (12 mo) or future (30 days) visit within the authorizing provider's specialty     Patient had office visit in the last 12 months or has a visit in the next 30 days with authorizing provider or within the authorizing provider's specialty.  See \"Patient Info\" tab in inbasket, or \"Choose Columns\" in Meds & Orders section of the refill encounter.              Passed - Medication is active on med list        simvastatin (ZOCOR) 40 MG tablet 45 tablet 0     Sig: Take 0.5 tablets (20 mg) by mouth At Bedtime       Statins Protocol Failed - 7/17/2019  7:57 PM        Failed - LDL on file in past 12 months     Recent Labs   Lab Test 06/19/18  0827   LDL Cannot estimate LDL when triglyceride exceeds 400 mg/dL  96             Passed - No abnormal creatine kinase in past 12 months     No lab results found.             Passed - Recent (12 mo) or future (30 days) visit within the authorizing provider's specialty     Patient had office visit in the last 12 months or has a visit in the next 30 days with authorizing provider or within the authorizing provider's specialty.  See \"Patient Info\" tab in inbasket, or \"Choose Columns\" in Meds & Orders section of the refill encounter.              Passed - Medication is active on med list        Passed - Patient is age 18 or older          "

## 2019-11-15 ENCOUNTER — TELEPHONE (OUTPATIENT)
Dept: FAMILY MEDICINE | Facility: CLINIC | Age: 54
End: 2019-11-15

## 2019-11-15 DIAGNOSIS — E78.5 HYPERLIPIDEMIA LDL GOAL <130: ICD-10-CM

## 2019-11-15 DIAGNOSIS — I10 HYPERTENSION GOAL BP (BLOOD PRESSURE) < 140/90: ICD-10-CM

## 2019-11-15 RX ORDER — METOPROLOL SUCCINATE 100 MG/1
100 TABLET, EXTENDED RELEASE ORAL DAILY
Qty: 30 TABLET | Refills: 0 | Status: SHIPPED | OUTPATIENT
Start: 2019-11-15 | End: 2020-01-14

## 2019-11-15 RX ORDER — SIMVASTATIN 40 MG
20 TABLET ORAL AT BEDTIME
Qty: 15 TABLET | Refills: 0 | Status: SHIPPED | OUTPATIENT
Start: 2019-11-15 | End: 2019-12-19

## 2019-11-15 NOTE — TELEPHONE ENCOUNTER
90 DAY PRESCRIPTION CONVERSION REQUEST.  Please send new prescriptions to patient's pharmacy for:    DRUG    1). SIMVASTATIN 43MG TAB    2). METOPROLOL ER TAB SUC 50MG

## 2019-11-15 NOTE — TELEPHONE ENCOUNTER
Medication is being filled for 1 time refill only due to:  Patient needs to be seen for fasting lab appointment and appointment with the provider for further refills. cholesterol and hypertension.  Natasha JORDANN, RN

## 2019-11-15 NOTE — LETTER
November 15, 2019    Reece Washington  [address] [facility] [place] [address]    Dear Reece,       We recently received a refill request for metoprolol and simvastatin.  We have refilled this for a one time 30 day supply only because you are due for a:    Hypertension and cholesterol office visit and fasting lab appointment      Please schedule this lab appointment 4-5 days prior to the office visit.     Please call at your earliest convenience so that there will not be a delay with your future refills.          Thank you,   Your [facility] Team/  [phone]

## 2019-12-19 DIAGNOSIS — E78.5 HYPERLIPIDEMIA LDL GOAL <130: ICD-10-CM

## 2019-12-19 RX ORDER — SIMVASTATIN 40 MG
TABLET ORAL
Qty: 15 TABLET | Refills: 0 | Status: SHIPPED | OUTPATIENT
Start: 2019-12-19 | End: 2020-01-14

## 2019-12-20 NOTE — TELEPHONE ENCOUNTER
"Patient has upcoming/ pending appointment:    Next 5 appointments (look out 90 days)    Jan 14, 2020  6:15 PM CST  [facility] [address] [facility] [address] [phone]          Rx refilled per [facility] refill protocol.    Daisha JORDANN, RN    Requested Prescriptions   Signed Prescriptions Disp Refills    simvastatin (ZOCOR) 40 MG tablet 15 tablet 0     Sig: TAKE 1/2 TABLET (20 MG) BY MOUTH AT BEDTIME **NEED TO BE SEEN AND HAVE LABS FOR FURTHER REFILLS**       Statins Protocol Failed - 12/19/2019  5:17 PM        Failed - LDL on file in past 12 months     Recent Labs   Lab Test 06/19/18  0827   LDL Cannot estimate LDL when triglyceride exceeds 400 mg/dL  96             Passed - No abnormal creatine kinase in past 12 months     No lab results found.             Passed - Recent (12 mo) or future (30 days) visit within the authorizing provider's specialty     Patient has had an office visit with the authorizing provider or a provider within the authorizing providers department within the previous 12 mos or has a future within next 30 days. See \"Patient Info\" tab in inbasket, or \"Choose Columns\" in Meds & Orders section of the refill encounter.              Passed - Medication is active on med list        Passed - Patient is age 18 or older          "

## 2019-12-26 ENCOUNTER — DOCUMENTATION ONLY (OUTPATIENT)
Dept: LAB | Facility: CLINIC | Age: 54
End: 2019-12-26

## 2019-12-26 DIAGNOSIS — E78.5 HYPERLIPIDEMIA LDL GOAL <130: Primary | ICD-10-CM

## 2019-12-26 DIAGNOSIS — I10 HYPERTENSION GOAL BP (BLOOD PRESSURE) < 140/90: ICD-10-CM

## 2019-12-26 DIAGNOSIS — R97.20 ELEVATED PROSTATE SPECIFIC ANTIGEN (PSA): ICD-10-CM

## 2019-12-27 NOTE — PROGRESS NOTES
Patient has an up coming lab appointment on 1.3.2020. Please review and place future orders that may be needed.     Thank you  Karyn Francisco MLT ([facility])

## 2020-01-03 DIAGNOSIS — I10 HYPERTENSION GOAL BP (BLOOD PRESSURE) < 140/90: ICD-10-CM

## 2020-01-03 DIAGNOSIS — R97.20 ELEVATED PROSTATE SPECIFIC ANTIGEN (PSA): ICD-10-CM

## 2020-01-03 DIAGNOSIS — E78.5 HYPERLIPIDEMIA LDL GOAL <130: ICD-10-CM

## 2020-01-03 LAB
ALBUMIN SERPL-MCNC: 3.9 G/DL (ref 3.4–5)
ANION GAP SERPL CALCULATED.3IONS-SCNC: 7 MMOL/L (ref 3–14)
BUN SERPL-MCNC: 13 MG/DL (ref 7–30)
CALCIUM SERPL-MCNC: 8.9 MG/DL (ref 8.5–10.1)
CHLORIDE SERPL-SCNC: 107 MMOL/L (ref 94–109)
CHOLEST SERPL-MCNC: 230 MG/DL
CO2 SERPL-SCNC: 26 MMOL/L (ref 20–32)
CREAT SERPL-MCNC: 0.77 MG/DL (ref 0.66–1.25)
GFR SERPL CREATININE-BSD FRML MDRD: >90 ML/MIN/{1.73_M2}
GLUCOSE SERPL-MCNC: 83 MG/DL (ref 70–99)
HDLC SERPL-MCNC: 42 MG/DL
LDLC SERPL CALC-MCNC: 116 MG/DL
NONHDLC SERPL-MCNC: 188 MG/DL
PHOSPHATE SERPL-MCNC: 3 MG/DL (ref 2.5–4.5)
POTASSIUM SERPL-SCNC: 4.1 MMOL/L (ref 3.4–5.3)
PSA SERPL-MCNC: 10.1 UG/L (ref 0–4)
SODIUM SERPL-SCNC: 140 MMOL/L (ref 133–144)
TRIGL SERPL-MCNC: 360 MG/DL

## 2020-01-03 PROCEDURE — 80061 LIPID PANEL: CPT | Performed by: FAMILY MEDICINE

## 2020-01-03 PROCEDURE — 36415 COLL VENOUS BLD VENIPUNCTURE: CPT | Performed by: FAMILY MEDICINE

## 2020-01-03 PROCEDURE — 80069 RENAL FUNCTION PANEL: CPT | Performed by: FAMILY MEDICINE

## 2020-01-03 PROCEDURE — 84153 ASSAY OF PSA TOTAL: CPT | Performed by: FAMILY MEDICINE

## 2020-01-13 DIAGNOSIS — I10 HYPERTENSION GOAL BP (BLOOD PRESSURE) < 140/90: ICD-10-CM

## 2020-01-13 DIAGNOSIS — E78.5 HYPERLIPIDEMIA LDL GOAL <130: ICD-10-CM

## 2020-01-13 RX ORDER — METOPROLOL SUCCINATE 50 MG/1
50 TABLET, EXTENDED RELEASE ORAL DAILY
Qty: 90 TABLET | Refills: 1 | Status: CANCELLED | OUTPATIENT
Start: 2020-01-13

## 2020-01-13 RX ORDER — SIMVASTATIN 40 MG
TABLET ORAL
Qty: 45 TABLET | Refills: 0 | Status: CANCELLED | OUTPATIENT
Start: 2020-01-13

## 2020-01-14 ENCOUNTER — OFFICE VISIT (OUTPATIENT)
Dept: FAMILY MEDICINE | Facility: CLINIC | Age: 55
End: 2020-01-14
Payer: COMMERCIAL

## 2020-01-14 VITALS
RESPIRATION RATE: 16 BRPM | BODY MASS INDEX: 39.39 KG/M2 | WEIGHT: 251 LBS | SYSTOLIC BLOOD PRESSURE: 138 MMHG | DIASTOLIC BLOOD PRESSURE: 89 MMHG | HEART RATE: 73 BPM | TEMPERATURE: 98.1 F | HEIGHT: 67 IN

## 2020-01-14 DIAGNOSIS — M54.2 NECK PAIN ON RIGHT SIDE: ICD-10-CM

## 2020-01-14 DIAGNOSIS — I10 HYPERTENSION GOAL BP (BLOOD PRESSURE) < 140/90: ICD-10-CM

## 2020-01-14 DIAGNOSIS — R97.20 ELEVATED PROSTATE SPECIFIC ANTIGEN (PSA): Primary | ICD-10-CM

## 2020-01-14 DIAGNOSIS — E78.5 HYPERLIPIDEMIA LDL GOAL <130: ICD-10-CM

## 2020-01-14 PROCEDURE — 99214 OFFICE O/P EST MOD 30 MIN: CPT | Performed by: FAMILY MEDICINE

## 2020-01-14 RX ORDER — SIMVASTATIN 40 MG
TABLET ORAL
Qty: 90 TABLET | Refills: 3 | Status: SHIPPED | OUTPATIENT
Start: 2020-01-14 | End: 2023-06-16

## 2020-01-14 RX ORDER — GABAPENTIN 100 MG/1
100 CAPSULE ORAL 3 TIMES DAILY
Qty: 90 CAPSULE | Refills: 1 | Status: SHIPPED | OUTPATIENT
Start: 2020-01-14 | End: 2023-06-16

## 2020-01-14 RX ORDER — METOPROLOL SUCCINATE 100 MG/1
100 TABLET, EXTENDED RELEASE ORAL DAILY
Qty: 90 TABLET | Refills: 1 | Status: SHIPPED | OUTPATIENT
Start: 2020-01-14 | End: 2020-02-26

## 2020-01-14 ASSESSMENT — MIFFLIN-ST. JEOR: SCORE: 1927.16

## 2020-01-14 NOTE — TELEPHONE ENCOUNTER
Patient has scheduled appointment tonight, will postpone medication to be certain it is addressed.  Renu Cornelius RN

## 2020-01-14 NOTE — PROGRESS NOTES
"SUBJECTIVE:  Reece Washington, a 54 year old male scheduled an appointment to discuss the following issues:  Follow-up blood pressure, chol check, elevated psa and obesity.  No urology in past. No family history prostate cancer.   No urine changes acutely/hematuria. Urine flow down. Nocturia x2.   Needs more exercise. No gym membership or exercise equipment.   No chest pain or shortness of breath.   Dad with cad - in 60's. Non-smoker.   No nausea, vomiting or diarrhea or black/bloody stools. Emotionally doing ok.    ok. No kids. Job - stressful at times.  GERD ok. ALCOHOL some beers not everyday.   Medical, social, surgical, and family histories reviewed.    ROS:  All other ROS negative.     OBJECTIVE:  /89   Pulse 73   Temp 98.1  F (36.7  C) (Oral)   Resp 16   Ht 1.702 m (5' 7\")   Wt 113.9 kg (251 lb)   BMI 39.31 kg/m    EXAM:  GENERAL APPEARANCE: healthy, alert and no distress  EYES: EOMI,  PERRL  HENT: ear canals and TM's normal and nose and mouth without ulcers or lesions  NECK: no adenopathy, no asymmetry, masses, or scars and thyroid normal to palpation  RESP: lungs clear to auscultation - no rales, rhonchi or wheezes  CV: regular rates and rhythm, normal S1 S2, no S3 or S4 and no murmur, click or rub -  ABDOMEN:  soft, nontender, no HSM or masses and bowel sounds normal  MS: extremities normal- no gross deformities noted, no evidence of inflammation in joints, FROM in all extremities.  PSYCH: mentation appears normal and affect normal/bright    ASSESSMENT / PLAN:  (R97.20) Elevated prostate specific antigen (PSA)  (primary encounter diagnosis)  Comment: possible cancer vs BPH  Plan: UROLOGY ADULT REFERRAL        Needs urology follow-up. Expected course and warning signs reviewed.     (M54.2) Neck pain on right side  Comment: stable  Plan: gabapentin (NEURONTIN) 100 MG capsule        Exercise and heat. Reveiwed risks and side effects of medication  Specialist if worse.     (I10) Hypertension " goal BP (blood pressure) < 140/90  Comment: stable  Plan: metoprolol succinate ER (TOPROL-XL) 100 MG 24         hr tablet        Continue self-monitor. Exercise and weight loss. Chest pain or shortness of breath to er. Recheck in 6 months      (E78.5) Hyperlipidemia LDL goal <130  Comment: stable  Plan: simvastatin (ZOCOR) 40 MG tablet        Weight loss/exercise.     Sreedhar Rich MD

## 2020-01-14 NOTE — NURSING NOTE
"Chief Complaint   Patient presents with     Lipids     Hypertension       Initial /89   Pulse 73   Temp 98.1  F (36.7  C) (Oral)   Resp 16   Ht 1.702 m (5' 7\")   Wt 113.9 kg (251 lb)   BMI 39.31 kg/m   Estimated body mass index is 39.31 kg/m  as calculated from the following:    Height as of this encounter: 1.702 m (5' 7\").    Weight as of this encounter: 113.9 kg (251 lb).    Natasha Johns CMA    "

## 2020-01-21 DIAGNOSIS — I10 HYPERTENSION GOAL BP (BLOOD PRESSURE) < 140/90: ICD-10-CM

## 2020-01-21 RX ORDER — METOPROLOL SUCCINATE 50 MG/1
50 TABLET, EXTENDED RELEASE ORAL DAILY
Qty: 90 TABLET | Refills: 0 | Status: SHIPPED | OUTPATIENT
Start: 2020-01-21 | End: 2023-06-16

## 2020-01-21 NOTE — TELEPHONE ENCOUNTER
"Requested Prescriptions   Signed Prescriptions Disp Refills    metoprolol succinate ER (TOPROL-XL) 50 MG 24 hr tablet 90 tablet 0     Sig: TAKE 1 TABLET (50 MG) BY MOUTH DAILY TAKE IN AM FOR BLOOD PRESSURE       Beta-Blockers Protocol Passed - 1/21/2020 11:12 AM        Passed - Blood pressure under 140/90 in past 12 months     BP Readings from Last 3 Encounters:   01/14/20 138/89   04/26/19 144/80   12/18/18 156/89           Passed - Patient is age 6 or older        Passed - Recent (12 mo) or future (30 days) visit within the authorizing provider's specialty     Patient has had an office visit with the authorizing provider or a provider within the authorizing providers department within the previous 12 mos or has a future within next 30 days. See \"Patient Info\" tab in inbasket, or \"Choose Columns\" in Meds & Orders section of the refill encounter.              Passed - Medication is active on med list          "

## 2020-01-22 ENCOUNTER — OFFICE VISIT (OUTPATIENT)
Dept: UROLOGY | Facility: OTHER | Age: 55
End: 2020-01-22
Payer: COMMERCIAL

## 2020-01-22 VITALS
SYSTOLIC BLOOD PRESSURE: 139 MMHG | RESPIRATION RATE: 16 BRPM | TEMPERATURE: 97.7 F | HEART RATE: 89 BPM | DIASTOLIC BLOOD PRESSURE: 89 MMHG | OXYGEN SATURATION: 95 %

## 2020-01-22 DIAGNOSIS — N40.1 BENIGN PROSTATIC HYPERPLASIA WITH LOWER URINARY TRACT SYMPTOMS, SYMPTOM DETAILS UNSPECIFIED: ICD-10-CM

## 2020-01-22 DIAGNOSIS — R97.20 ELEVATED PROSTATE SPECIFIC ANTIGEN (PSA): Primary | ICD-10-CM

## 2020-01-22 PROCEDURE — 87077 CULTURE AEROBIC IDENTIFY: CPT | Performed by: UROLOGY

## 2020-01-22 PROCEDURE — 99203 OFFICE O/P NEW LOW 30 MIN: CPT | Mod: 25 | Performed by: UROLOGY

## 2020-01-22 PROCEDURE — 87070 CULTURE OTHR SPECIMN AEROBIC: CPT | Performed by: UROLOGY

## 2020-01-22 PROCEDURE — 51798 US URINE CAPACITY MEASURE: CPT | Performed by: UROLOGY

## 2020-01-22 PROCEDURE — 87186 SC STD MICRODIL/AGAR DIL: CPT | Performed by: UROLOGY

## 2020-01-22 ASSESSMENT — PAIN SCALES - GENERAL: PAINLEVEL: NO PAIN (0)

## 2020-01-22 NOTE — PATIENT INSTRUCTIONS
Prostate Biopsy Ultrasound Instructions      1. Prostate Biopsy is scheduled for February  @ 9:45. You will check in to your appointment at the , and your urology provider will meet you in the radiology department.    2. Take prescribed antibiotics 2 times per day for 5 days, starting the morning of your biopsy. Follow the instructions listed on the medication label. Finish the entire course of antibiotics.    3. Use a fleets enema  2 hours before the procedure. This can be purchased over the counter. After you use this, do not eat or drink anything prior to your procedure.      4. If you are taking any blood thinners (Aspirin, Ibuprofen, Aleve) discontinue one week before the procedure.  Tylenol is okay.  If taking coumadin, you must check with your primary care provider for further directions.    5. Your follow up appointment with Dr. Velasco scheduled for Wed. Feb. 12th @ 10. You will discuss the biopsy results at this appointment.       Patient Biopsy Information      Patient Information: You have had an examination of the prostate gland called a NIKKI (Digital Rectal Examination), where a finger was inserted into your rectum to enable the doctor to feel your prostate gland. You may have also had a PSA (Prostate Specific Antigen) blood test. Sometimes an elevated PSA and an abnormal NIKKI can be signs of prostate cancer. The doctor has recommended that you have a prostate biopsy to determine a diagnosis.     What is a prostate biopsy?: A prostate biopsy is a diagnostic test in which pieces of tissue are removed from the prostate and examined to determine if cancerous cells are present. You will be positioned for the biopsy as preferred by the physician. An ultrasound probe is inserted into the rectum, and a needle is passed through the wall of the rectum. A local anesthetic is then injected through this needle. Following this injection, a needle will be inserted into the prostate gland to take a sample of  cells. The doctor will usually take 12 separate biopsies. You will hear some clicking sounds from the biopsy instrument while the biopsies are taken. The procedure will take approximately five to ten minutes.     What are the risks?:     Infection: As there is a chance of infection, we give you antibiotics before the procedure to reduce this risk. However, if you experience a fever, chills, nausea, or any other out of the ordinary symptoms, within the 72 hours after your biopsy, you need to go directly to the emergency room. The antibiotic prescription will be sent to your preferred pharmacy after your rectal culture results come back. We will call you to let you know you can pick them up. You will follow the directions listed on the bottle. Sometimes 2 different antibiotics are ordered. Start the antibiotics on the day of the biopsy.    Bleeding: Some men will see blood in the urine, semen, and stool following their prostate biopsy. Bleeding can come and go for 6-8 weeks after the biopsy. If you take any blood thinning medication, such as aspirin, warfarin (coumadin), or clopidogrel, then your risk of bleeding will be higher. Discontinue blood thinning medications 1 week prior to your prostate biopsy. If you experience a large amount of bleeding, please seek medical attention.    Retention: Some men are unable to pass urine after the biopsy. This is called urine retention. This is very rare, but it does occur in some instances. If you are unable to urinate, please call our clinic or seek medical attention at the emergency department or urgent care.     Getting the results: The biopsy samples will be sent to a laboratory for examination by a pathologist. It can take up to one week, sometimes longer, for your doctor to receive the results. You will be given an appointment to follow up with your urologist within the 1-2 weeks following the biopsy. If you did not receive an appointment for the results of the biopsy at  the time of the scheduling, please contact the clinic to arrange an appointment for the results.     WE ARE NOT, IN ANY CIRCUMSTANCES, ALLOWED TO GIVE RESULTS OVER THE PHONE. RESULTS MUST BE DISCUSSED IN PERSON.    What to expect following a biopsy: You are advised to take it easy for the remainder of the day. You may eat and drink as normal. No restrictions to your normal daily routine. As there is risk of infection, ensure you complete the entire course of antibiotics that was prescribed by your urologist.     Contact Information: Please call the clinic at [phone] with any further questions. **DO NO leave an urgent message on this voicemail, as we may not receive it until the following business day.**

## 2020-01-22 NOTE — PROGRESS NOTES
Bladder Scan performed. 58 mL maximum residual urine detected after 3 scans. MD informed. Shama Snow RN
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
## 2020-01-25 LAB
BACTERIA SPEC CULT: ABNORMAL
BACTERIA SPEC CULT: ABNORMAL
Lab: ABNORMAL
SPECIMEN SOURCE: ABNORMAL

## 2020-01-26 RX ORDER — CEPHALEXIN 500 MG/1
500 CAPSULE ORAL 3 TIMES DAILY
Qty: 9 CAPSULE | Refills: 0 | Status: SHIPPED | OUTPATIENT
Start: 2020-01-26 | End: 2020-02-26

## 2020-01-26 RX ORDER — CIPROFLOXACIN 500 MG/1
500 TABLET, FILM COATED ORAL 2 TIMES DAILY
Qty: 6 TABLET | Refills: 0 | Status: SHIPPED | OUTPATIENT
Start: 2020-01-26 | End: 2020-02-26

## 2020-02-06 ENCOUNTER — ANCILLARY PROCEDURE (OUTPATIENT)
Dept: ULTRASOUND IMAGING | Facility: OTHER | Age: 55
End: 2020-02-06
Payer: COMMERCIAL

## 2020-02-06 ENCOUNTER — OFFICE VISIT (OUTPATIENT)
Dept: UROLOGY | Facility: OTHER | Age: 55
End: 2020-02-06
Payer: COMMERCIAL

## 2020-02-06 DIAGNOSIS — R97.20 ELEVATED PROSTATE SPECIFIC ANTIGEN (PSA): ICD-10-CM

## 2020-02-06 DIAGNOSIS — R97.20 ELEVATED PROSTATE SPECIFIC ANTIGEN (PSA): Primary | ICD-10-CM

## 2020-02-06 PROCEDURE — 76872 US TRANSRECTAL: CPT | Performed by: UROLOGY

## 2020-02-06 PROCEDURE — 88344 IMHCHEM/IMCYTCHM EA MLT ANTB: CPT | Performed by: UROLOGY

## 2020-02-06 PROCEDURE — 88305 TISSUE EXAM BY PATHOLOGIST: CPT | Mod: 26 | Performed by: UROLOGY

## 2020-02-06 PROCEDURE — 55700 ZZHC BIOPSY PROSTATE NEEDLE/PUNCH: CPT | Performed by: UROLOGY

## 2020-02-06 PROCEDURE — 88344 IMHCHEM/IMCYTCHM EA MLT ANTB: CPT | Mod: 26 | Performed by: UROLOGY

## 2020-02-06 PROCEDURE — 96372 THER/PROPH/DIAG INJ SC/IM: CPT | Mod: 59 | Performed by: UROLOGY

## 2020-02-06 PROCEDURE — 88305 TISSUE EXAM BY PATHOLOGIST: CPT | Performed by: UROLOGY

## 2020-02-06 PROCEDURE — 76942 ECHO GUIDE FOR BIOPSY: CPT | Performed by: UROLOGY

## 2020-02-06 RX ORDER — GENTAMICIN 40 MG/ML
80 INJECTION, SOLUTION INTRAMUSCULAR; INTRAVENOUS ONCE
Status: COMPLETED | OUTPATIENT
Start: 2020-02-06 | End: 2020-02-06

## 2020-02-06 RX ADMIN — GENTAMICIN 80 MG: 40 INJECTION, SOLUTION INTRAMUSCULAR; INTRAVENOUS at 09:50

## 2020-02-06 NOTE — PROGRESS NOTES
Clinic Administered Medication Documentation    MEDICATION LIST:   Injectable Medication Documentation    Patient was given Gentamicin. Prior to medication administration, verified patients identity using patient s name and date of birth. Please see MAR and medication order for additional information. Patient instructed to remain in clinic for 15 minutes and report any adverse reaction to staff immediately .      Was entire vial of medication used? Yes  Vial/Syringe: Single dose vial  Expiration Date:  09/01/20  Was this medication supplied by the patient? No     The following medication was given:     MEDICATION: gentamicin  ROUTE: IM  SITE: Ventrogluteal - Left  DOSE: 80mg  LOT #: [id number]  :  [employer]  EXPIRATION DATE:  09/01/20  NDC#: 65991-602-06    Xuan Evans RN Specialty Triage 2/6/2020 9:53 AM

## 2020-02-06 NOTE — PROGRESS NOTES
Patient is here for prostate biopsy.  He was placed in the dorsal lithotomy position.  Prostate ultrasound placed transrectally.  The neurovascular bundle was anesthetized using 1% lidocaine.  Prostate measurements obtained.  Prostate size is 51 cc.  12 biopsies obtained under guidance of prostate ultrasound using biopsy needle.  Patient tolerated procedure.  Return to clinic in one week for biopsy result.

## 2020-02-10 LAB — COPATH REPORT: NORMAL

## 2020-02-12 ENCOUNTER — OFFICE VISIT (OUTPATIENT)
Dept: UROLOGY | Facility: OTHER | Age: 55
End: 2020-02-12
Payer: COMMERCIAL

## 2020-02-12 VITALS
HEART RATE: 77 BPM | BODY MASS INDEX: 37.21 KG/M2 | HEIGHT: 68 IN | DIASTOLIC BLOOD PRESSURE: 83 MMHG | SYSTOLIC BLOOD PRESSURE: 142 MMHG | WEIGHT: 245.5 LBS | OXYGEN SATURATION: 95 % | TEMPERATURE: 98 F

## 2020-02-12 DIAGNOSIS — I10 HYPERTENSION GOAL BP (BLOOD PRESSURE) < 140/90: ICD-10-CM

## 2020-02-12 DIAGNOSIS — R30.0 DYSURIA: ICD-10-CM

## 2020-02-12 DIAGNOSIS — C61 PROSTATE CANCER (H): Primary | ICD-10-CM

## 2020-02-12 PROCEDURE — 99214 OFFICE O/P EST MOD 30 MIN: CPT | Performed by: UROLOGY

## 2020-02-12 RX ORDER — GLUCOSAMINE/CHONDR SU A SOD 750-600 MG
TABLET ORAL
COMMUNITY
End: 2023-06-16

## 2020-02-12 RX ORDER — LUTEIN 6 MG
TABLET ORAL
COMMUNITY
End: 2023-06-16

## 2020-02-12 RX ORDER — GUAR GUM
PACKET (EA) ORAL
COMMUNITY
End: 2023-06-16

## 2020-02-12 ASSESSMENT — MIFFLIN-ST. JEOR: SCORE: 1918.08
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
## 2020-02-13 ENCOUNTER — TELEPHONE (OUTPATIENT)
Dept: FAMILY MEDICINE | Facility: CLINIC | Age: 55
End: 2020-02-13

## 2020-02-13 DIAGNOSIS — F43.0 ANXIETY AS ACUTE REACTION TO EXCEPTIONAL STRESS: Primary | ICD-10-CM

## 2020-02-13 DIAGNOSIS — F41.1 ANXIETY AS ACUTE REACTION TO EXCEPTIONAL STRESS: Primary | ICD-10-CM

## 2020-02-13 RX ORDER — SERTRALINE HYDROCHLORIDE 25 MG/1
25 TABLET, FILM COATED ORAL DAILY
Qty: 30 TABLET | Refills: 0 | Status: SHIPPED | OUTPATIENT
Start: 2020-02-13 | End: 2023-06-16

## 2020-02-13 NOTE — TELEPHONE ENCOUNTER
"Provider:  Are you willing to start the patient on a medication for his symptoms?  He was seen in clinic 1/14/2020. Thank you. Macarena Osorio R.N.    Consent to communicate with wife Codi Washington dated 6/2/2017 on file. Thank you. Macarena Osorio R.N.     Wife reports Reece is really having a hard time dealing with the dx of prostate cancer. His mind is wondering, he is tearful, depressed and not focused   Wife denies suicidal/homicidal thoughts.  She is wondering if there is anything that he can start to tryo help him through this?  I did explain that if something is given it is not an instnataneous response and will take dome time to \"kick\"in.  She is aware of this.  Wife is aware that Dr. Rich is out of the office for today and will be in tomorrow.      She would like a call back at the [phone] number with the provider's response.         "

## 2020-02-13 NOTE — TELEPHONE ENCOUNTER
Reason for Call:  Other call back    Detailed comments: spouse is calling stating patient was diagnosed with prostate cancer and having a heard time dealing with this. Would like to request some medication if possible to calm nerves and would like to discuss weaning patient off current medications. Please call to discuss. Thank you.    Phone Number Patient can be reached at: [phone]    Best Time:     Can we leave a detailed message on this number? YES    Call taken on 2/13/2020 at 3:43 PM by Swetha Corbin

## 2020-02-14 RX ORDER — HYDROXYZINE PAMOATE 25 MG/1
25-50 CAPSULE ORAL EVERY 8 HOURS PRN
Qty: 90 CAPSULE | Refills: 0 | Status: SHIPPED | OUTPATIENT
Start: 2020-02-14 | End: 2023-06-16

## 2020-02-14 NOTE — TELEPHONE ENCOUNTER
Pts wife came to the [facility] location because she was hoping to  some information on prostate cancer that weldon wanted them to have. PT was seen yesterday with him and was diagnosed with prostate cancer. I did not have anything for her. She seems upset and just wants some more information. Her and her  are having a hard time with this. Sof if someone can call her back.  Also she is wanting to know the results of the urine that was taken yesterday too. Please advise.

## 2020-02-14 NOTE — TELEPHONE ENCOUNTER
Please call. We can try zoloft to help with depression/anxiety. Please help set-up md appointment with myself in 7-10 adyd to see if medication is helpful. Sooner if worse. If SUICIAL IDEATION OR HOMOCIDAL IDEATION OR ROSEANNE TO ER. Sreedhar Rich MD

## 2020-02-14 NOTE — TELEPHONE ENCOUNTER
Pt wife notified of provider message as written.  She states he is not having trouble sleeping.  He is having trouble with anxiety about his dx right now.  Advised most medications that would work immediately would be controlled substances and would need an appointment to get.  Advised there are lots of rules around controlled substances and an appointment is one of the requirements.  I offered an appointment on Monday morning or afternoon and she declined.  Advised if she wants one of the appointment's to call me back.      Pt wife states pt was just seen and is just struggling with the dx now, the appointment on Monday seems far away. She feels like we do not understand what they are saying.   Natasha Way BSN, RN

## 2020-02-14 NOTE — TELEPHONE ENCOUNTER
Discussed with Dr. Rich who advises ok to send prescription for hydroxyzine 25 mg pills take 1-2 q 8 h prn for anxiety.  Advise it may make him drowsy and he should not drive when taking it.  Prescription sent to pharmacy.    Pt wife notified of provider response and instructions.  She will  prescription.  She will call back if they need an appointment next week.  Advised likely she will need to put a message into the team but when we get the message we can help make an appointment.  Ok for team or same day slot.    To provider to cosign  Natasha JORDANN, RN    Natasha GIRALDO, RN  Sreedhar Rich MD

## 2020-02-14 NOTE — TELEPHONE ENCOUNTER
Called and spoke to patient's wife.   Information about prostate cancer emailed to her.   Libertad Tamayo RN

## 2020-02-14 NOTE — TELEPHONE ENCOUNTER
Already did zoloft over the phone. Need to be seen for sleep aide. Patient can try over the counter melatonin or tylenol pm. If SUICIAL IDEATION OR HOMOCIDAL IDEATION OR ROSEANNE TO ER. Can see patient on Monday if not sleeping well over weekend. Sreedhar Rich MD

## 2020-02-14 NOTE — TELEPHONE ENCOUNTER
Left message on answering machine for patient wife to call back about Dr. Rich message to to [phone].  Specialty will need to take care of new message added to this.  Natasha JORDANN, RN

## 2020-02-14 NOTE — TELEPHONE ENCOUNTER
Wife Daniela, ( Release to share Personal Health Information is identified) is informed of MD note below, as it is written. Verbalized good understanding.  Wife requested potential side effects and how medication works.  Wife expressed concern for sleeplessness and worsening anxiety initially.  Reports patient is falling asleep, however wakes up in the middle of the night and can not go back to sleep.  Worse symptom is anxiety.  Has appointment for MRI 2/19, sees Dr Lockhart for results 2/26, wanted to see Dr Sreedhar Rich after results are received appointment scheduled with Dr Sreedhar Rich 2/27/20.      Wife is requesting something at night to help with anxiety and sleep.   ? Hydroxyzine?   Will not take before work does better when occupied.      Please advise  Renu Cornelius RN

## 2020-02-19 ENCOUNTER — HOSPITAL ENCOUNTER (OUTPATIENT)
Dept: NUCLEAR MEDICINE | Facility: CLINIC | Age: 55
Setting detail: NUCLEAR MEDICINE
End: 2020-02-19
Attending: UROLOGY
Payer: COMMERCIAL

## 2020-02-19 ENCOUNTER — HOSPITAL ENCOUNTER (OUTPATIENT)
Dept: NUCLEAR MEDICINE | Facility: CLINIC | Age: 55
Setting detail: NUCLEAR MEDICINE
Discharge: HOME | End: 2020-02-19
Attending: UROLOGY | Admitting: UROLOGY
Payer: COMMERCIAL

## 2020-02-19 ENCOUNTER — HOSPITAL ENCOUNTER (OUTPATIENT)
Dept: CT IMAGING | Facility: CLINIC | Age: 55
Discharge: HOME | End: 2020-02-19
Attending: UROLOGY | Admitting: UROLOGY
Payer: COMMERCIAL

## 2020-02-19 DIAGNOSIS — C61 PROSTATE CANCER (H): ICD-10-CM

## 2020-02-19 PROCEDURE — 78306 BONE IMAGING WHOLE BODY: CPT

## 2020-02-19 PROCEDURE — 25000128 H RX IP 250 OP 636: Performed by: RADIOLOGY

## 2020-02-19 PROCEDURE — A9561 TC99M OXIDRONATE: HCPCS | Performed by: UROLOGY

## 2020-02-19 PROCEDURE — 25000125 ZZHC RX 250: Performed by: RADIOLOGY

## 2020-02-19 PROCEDURE — 74177 CT ABD & PELVIS W/CONTRAST: CPT

## 2020-02-19 PROCEDURE — 34300033 ZZH RX 343: Performed by: UROLOGY

## 2020-02-19 RX ORDER — IOPAMIDOL 755 MG/ML
500 INJECTION, SOLUTION INTRAVASCULAR ONCE
Status: COMPLETED | OUTPATIENT
Start: 2020-02-19 | End: 2020-02-19

## 2020-02-19 RX ADMIN — IOPAMIDOL 100 ML: 755 INJECTION, SOLUTION INTRAVENOUS at 08:46

## 2020-02-19 RX ADMIN — Medication 25.2 MILLICURIE: at 08:05

## 2020-02-19 RX ADMIN — SODIUM CHLORIDE 60 ML: 9 INJECTION, SOLUTION INTRAVENOUS at 08:47

## 2020-02-26 ENCOUNTER — TELEPHONE (OUTPATIENT)
Dept: UROLOGY | Facility: CLINIC | Age: 55
End: 2020-02-26

## 2020-02-26 ENCOUNTER — OFFICE VISIT (OUTPATIENT)
Dept: UROLOGY | Facility: OTHER | Age: 55
End: 2020-02-26
Payer: COMMERCIAL

## 2020-02-26 VITALS — SYSTOLIC BLOOD PRESSURE: 165 MMHG | DIASTOLIC BLOOD PRESSURE: 90 MMHG | HEART RATE: 86 BPM | OXYGEN SATURATION: 97 %

## 2020-02-26 DIAGNOSIS — I10 HYPERTENSION GOAL BP (BLOOD PRESSURE) < 140/90: ICD-10-CM

## 2020-02-26 DIAGNOSIS — C61 PROSTATE CANCER (H): Primary | ICD-10-CM

## 2020-02-26 PROCEDURE — 99214 OFFICE O/P EST MOD 30 MIN: CPT | Performed by: UROLOGY
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
## 2020-02-27 ENCOUNTER — OFFICE VISIT (OUTPATIENT)
Dept: FAMILY MEDICINE | Facility: CLINIC | Age: 55
End: 2020-02-27
Payer: COMMERCIAL

## 2020-02-27 VITALS
BODY MASS INDEX: 35.85 KG/M2 | SYSTOLIC BLOOD PRESSURE: 145 MMHG | WEIGHT: 235.8 LBS | HEART RATE: 84 BPM | TEMPERATURE: 98.5 F | DIASTOLIC BLOOD PRESSURE: 96 MMHG

## 2020-02-27 DIAGNOSIS — C61 PROSTATE CANCER (H): Primary | ICD-10-CM

## 2020-02-27 DIAGNOSIS — F43.0 ANXIETY AS ACUTE REACTION TO EXCEPTIONAL STRESS: ICD-10-CM

## 2020-02-27 DIAGNOSIS — F41.1 ANXIETY AS ACUTE REACTION TO EXCEPTIONAL STRESS: ICD-10-CM

## 2020-02-27 PROCEDURE — 99214 OFFICE O/P EST MOD 30 MIN: CPT | Performed by: FAMILY MEDICINE

## 2020-02-27 RX ORDER — ALPRAZOLAM 0.25 MG
0.25 TABLET ORAL 2 TIMES DAILY PRN
Qty: 14 TABLET | Refills: 1 | Status: SHIPPED | OUTPATIENT
Start: 2020-02-27 | End: 2023-06-16

## 2020-02-27 ASSESSMENT — ANXIETY QUESTIONNAIRES
1. FEELING NERVOUS, ANXIOUS, OR ON EDGE: MORE THAN HALF THE DAYS
GAD7 TOTAL SCORE: 16
6. BECOMING EASILY ANNOYED OR IRRITABLE: SEVERAL DAYS
5. BEING SO RESTLESS THAT IT IS HARD TO SIT STILL: SEVERAL DAYS
3. WORRYING TOO MUCH ABOUT DIFFERENT THINGS: NEARLY EVERY DAY
2. NOT BEING ABLE TO STOP OR CONTROL WORRYING: NEARLY EVERY DAY
7. FEELING AFRAID AS IF SOMETHING AWFUL MIGHT HAPPEN: NEARLY EVERY DAY

## 2020-02-27 ASSESSMENT — PATIENT HEALTH QUESTIONNAIRE - PHQ9
SUM OF ALL RESPONSES TO PHQ QUESTIONS 1-9: 14
5. POOR APPETITE OR OVEREATING: NEARLY EVERY DAY

## 2020-02-27 NOTE — TELEPHONE ENCOUNTER
Type of surgery: Radical retropubic prostatectomy and bilateral pelvic lymph node dissection  CPT code 19763, 04796  Prostate cancer (H) [C61]  - Primary   Location of surgery: [facility]  Date and time of surgery: 04/15/2020 / 12:30p  Surgeon: Chantelle  Pre-Op Appt Date: 04/02/2020  Post-Op Appt Date: 04/29/2020   Packet sent out: Yes  Pre-cert/Authorization completed:  No prior auth required for surgery. Both codes. Reference number is [id number]  Date: 02/28/2020    OUT OF NETWORK.

## 2020-02-27 NOTE — PROGRESS NOTES
SUBJECTIVE:  Reece Washington, a 54 year old male scheduled an appointment to discuss the following issues:  Follow-up anxiety/depression concerns after diagnosis of prostate cancer. Started on zoloft and vistaril 2 weeks ago.  Vistaril sedating.   zoloft slightly helpful.  No mets via bone scan.  Good support with wife. Work -  -not too stressful.  Step-kids - grown.   No therapist. Even before this worries a lot.   Caffeine - tea. Limiting ALCOHOL and lower carb diet.   Medical, social, surgical, and family histories reviewed.    ROS:  All other ROS negative.     OBJECTIVE:  BP (!) 145/96   Pulse 84   Temp 98.5  F (36.9  C) (Oral)   Wt 107 kg (235 lb 12.8 oz)   BMI 35.85 kg/m    EXAM:  GENERAL APPEARANCE: healthy, alert and no distress  RESP: lungs clear to auscultation - no rales, rhonchi or wheezes  CV: regular rates and rhythm, normal S1 S2, no S3 or S4 and no murmur, click or rub -  ABDOMEN:  soft, nontender, no HSM or masses and bowel sounds normal  PSYCH: mentation appears normal and affect normal/bright  PSYCH: anxious    ASSESSMENT / PLAN:  (C61) Prostate cancer (H)  (primary encounter diagnosis)  Comment: normal bone density scan  Plan: surgery per urology. Patient generally healthy and should do ok with surgery.     (F41.1,  F43.0) Anxiety as acute reaction to exceptional stress  Comment: needs help  Plan: sertraline (ZOLOFT) 50 MG tablet, ALPRAZolam         (XANAX) 0.25 MG tablet, MENTAL HEALTH REFERRAL         - Adult; Outpatient Treatment;         Individual/Couples/Family/Group Therapy/Health         Psychology; [facility]: [facility]         [phone]; We will contact you to schedule        the appointment or please call with any         questions        Double zoloft. Reveiwed risks and side effects of medication  Exercise and limit caffeine/ avoid ALCOHOL. Follow-up therapist. Good support system with wife. If SUICIAL IDEATION OR HOMOCIDAL IDEATION OR ROSEANNE TO ER.  Prn xanax. Signed up for my chart. Return to clinic 4-6 weeks - sooner if worse. Call/email with questions/concerns     Sreedhar Rich MD

## 2020-02-28 ASSESSMENT — ANXIETY QUESTIONNAIRES: GAD7 TOTAL SCORE: 16

## 2020-03-15 ENCOUNTER — HEALTH MAINTENANCE LETTER (OUTPATIENT)
Age: 55
End: 2020-03-15

## 2020-03-27 ENCOUNTER — TELEPHONE (OUTPATIENT)
Dept: UROLOGY | Facility: CLINIC | Age: 55
End: 2020-03-27

## 2020-03-27 NOTE — TELEPHONE ENCOUNTER
Patient's wife calling. Patient has surgery scheduled on 4/15 with Dr. Lockhart for RRP. They are wondering if it is safe to go ahead with the surgery or if OK to postpone it until virus concerns are over. Will route to MD to advise.  Sonia Avendaño, CMA

## 2020-03-30 NOTE — TELEPHONE ENCOUNTER
I have spoke with patient regarding his surgery being canceled on 04/15/2020 patient has additional questions, Please call and advise Thank you

## 2020-03-31 NOTE — TELEPHONE ENCOUNTER
Called and spoke to patient. Patient has concerns about waiting to have the surgery as the cancer might spread.   Per Dr. Lockhart waiting a couple months is ok as the risk of surgery is to great right now with COVID.   Caller agrees.   Will route to provider for review.   Libertad Tamayo RN

## 2020-05-14 ENCOUNTER — VIRTUAL VISIT (OUTPATIENT)
Dept: UROLOGY | Facility: CLINIC | Age: 55
End: 2020-05-14
Payer: COMMERCIAL

## 2020-05-14 DIAGNOSIS — C61 PROSTATE CANCER (H): Primary | ICD-10-CM

## 2020-05-14 PROCEDURE — 99213 OFFICE O/P EST LOW 20 MIN: CPT | Mod: 95 | Performed by: UROLOGY
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
## 2020-07-10 ENCOUNTER — TELEPHONE (OUTPATIENT)
Dept: UROLOGY | Facility: CLINIC | Age: 55
End: 2020-07-10

## 2020-07-10 DIAGNOSIS — C61 PROSTATE CANCER (H): Primary | ICD-10-CM

## 2020-07-10 NOTE — TELEPHONE ENCOUNTER
Patient calling to reschedule surgery.   Will route to provider for orders.   Libertad Tamayo RN

## 2020-07-13 ENCOUNTER — TELEPHONE (OUTPATIENT)
Dept: UROLOGY | Facility: CLINIC | Age: 55
End: 2020-07-13

## 2020-07-13 DIAGNOSIS — C61 PROSTATE CANCER (H): Primary | ICD-10-CM

## 2020-07-13 NOTE — TELEPHONE ENCOUNTER
Reason for Call:  Other Orders    Detailed comments: Patient has been scheduled for surgery 8-5-20. Need pre-surgery COVID orders.    Phone Number Patient can be reached at: Cell number on file:    Telephone Information:   Mobile [phone]       Best Time: any    Can we leave a detailed message on this number? YES    Call taken on 7/13/2020 at 11:30 AM by Lizet Teixeira

## 2020-07-13 NOTE — TELEPHONE ENCOUNTER
Surgery rescheduled to 8-5-20. Same insurance, new group #.    Dr. Lockhart is out of network. [facility] is in network.     Approved for 08/05/2020. [id number].     Called about out of network status. They will pay only 50% of bill for office and professional. Referral will not work to lower costs.     Called the patient and left a message. Made him aware. Suggested he find a doctor in network and Dr. Lockhart will help him find another doctor within his network.     Jesi

## 2020-07-15 ENCOUNTER — TELEPHONE (OUTPATIENT)
Dept: UROLOGY | Facility: CLINIC | Age: 55
End: 2020-07-15

## 2020-07-15 NOTE — TELEPHONE ENCOUNTER
Talked to pt's wife about out of network status. Needs to changed urologists to an [facility] doctor. Put a message through to the urology team and Dr. Lockhart. 07/15/2020

## 2020-07-15 NOTE — TELEPHONE ENCOUNTER
Called and spoke to patient's wife.   Patient needs to follow up in clinic with [facility].   Explained that more than likely they will be able to see our records electronically.   Libertad Tamayo RN

## 2020-07-15 NOTE — TELEPHONE ENCOUNTER
Patient is in [facility] network. Would like a urologist within [facility] network. Wife has questions and told her to ask Dr. Lockhart.  One concern is about having to start over. Would they have to do new scans and everything?    Would like a call back ASAP (by the end of the week) so they can proceed. Is concerned.     Thank you.     Jesi gillis dept.

## 2020-09-04 ENCOUNTER — TRANSFERRED RECORDS (OUTPATIENT)
Dept: HEALTH INFORMATION MANAGEMENT | Facility: CLINIC | Age: 55
End: 2020-09-04

## 2020-09-23 ENCOUNTER — TRANSFERRED RECORDS (OUTPATIENT)
Dept: HEALTH INFORMATION MANAGEMENT | Facility: CLINIC | Age: 55
End: 2020-09-23

## 2021-01-09 ENCOUNTER — HEALTH MAINTENANCE LETTER (OUTPATIENT)
Age: 56
End: 2021-01-09

## 2021-05-08 ENCOUNTER — HEALTH MAINTENANCE LETTER (OUTPATIENT)
Age: 56
End: 2021-05-08

## 2021-09-27 ENCOUNTER — TRANSFERRED RECORDS (OUTPATIENT)
Dept: HEALTH INFORMATION MANAGEMENT | Facility: CLINIC | Age: 56
End: 2021-09-27

## 2021-10-23 ENCOUNTER — HEALTH MAINTENANCE LETTER (OUTPATIENT)
Age: 56
End: 2021-10-23

## 2022-06-04 ENCOUNTER — HEALTH MAINTENANCE LETTER (OUTPATIENT)
Age: 57
End: 2022-06-04

## 2022-08-17 ENCOUNTER — TRANSCRIBE ORDERS (OUTPATIENT)
Dept: OTHER | Age: 57
End: 2022-08-17

## 2022-08-17 ENCOUNTER — TELEPHONE (OUTPATIENT)
Dept: GASTROENTEROLOGY | Facility: CLINIC | Age: 57
End: 2022-08-17

## 2022-08-17 DIAGNOSIS — Z12.11 SCREENING FOR COLON CANCER: Primary | ICD-10-CM

## 2022-08-17 NOTE — TELEPHONE ENCOUNTER
Screening Questions    BlueKIND OF PREP RedLOCATION [review exclusion criteria] GreenSEDATION TYPE      1. Are you active on mychart? Y    2. What insurance is in the chart? [employer]     3.   Ordering/Referring Provider: NATACHA    4. BMI   (If greater than 40 review exclusion criteria [PAC APPT IF [MAC] @ [facility])  35.7  [If yes, BMI OVER 40-EXTENDED PREP]      **(Sedation review/consideration needed)**  Do you have a legal guardian or Medical Power of    and/or are you able to give consent for your medical care?     Y    5. Have you had a positive covid test in the last 90 days?   N - N    6.  Are you currently on dialysis?   N [ If yes, G-PREP & HOSPITAL setting ONLY]     7.  Do you have chronic kidney disease?  N [ If yes, G-PREP ]    8.   Do you have a diagnosis of diabetes?   N   [ If yes, G-PREP ]    9.  On a regular basis do you go 3-5 days between bowel movements?   N   [ If yes, EXTENDED PREP]    10.  Are you taking any prescription pain medications on a routine schedule?    N - N [ If yes, EXTENDED PREP] [If yes, MAC]      11.   Do you have any chemical dependencies such as alcohol, street drugs, or methadone?    N [If yes, MAC]    12.   Do you have any history of post-traumatic stress syndrome, severe anxiety or history of psychosis?    N  [If yes, MAC]    13.  [FEMALES] Are you currently pregnant? NA    If yes, how many weeks?       Respiratory/Heart Screening:  [If yes to any of the following HOSPITAL setting only]     14. Do you have Pulmonary Hypertension [Lungs]?   N       15. Do you have UNCONTROLLED asthma?   N     16.  Do you use daily home oxygen?  N      17. Do you have mod to severe Obstructive Sleep Apnea?         (OKAY @ [facility]  [facility]  [facility]  PH  [facility]  [facility] - if pt is not on OXYGEN)  N      18.   Have you had a heart or lung transplant?   N      19.   Have you had a stroke or Transient ischemic attack (TIA - aka  mini stroke ) within 6 months?  (If yes, please review exclusion criteria)  N     20.    In the past 6 months, have you had any heart related issues including cardiomyopathy or heart attack?   N           If yes, did it require cardiac stenting or other implantable device?   N      21.   Do you have any implantable devices in your body (pacemaker, defib, LVAD)? (If yes, please review exclusion criteria)  N   22.  Do you take the medication Phentermine?  NO        23. Do you take nitroglycerin?   N           If yes, how often? N  (if yes, HOSPITAL setting ONLY)    24.  Are you currently taking any blood thinners?    [If yes, INFORM patient to follw up w/ ORDERING PROVIDER FOR BRIDGING INSTRUCTIONS]     N    25.   Do you transfer independently?                (If NO, please HOSPITAL setting ONLY)  Y    26.   Preferred LOCAL Pharmacy for Pre Prescription:         [facility] - [place], [place] - [address] [place] [address]    Scheduling Details  (Please ask for phone number if not scheduled by patient)      Caller : DONALD   Date of Procedure: 10/4  Surgeon: DON  Location:         Sedation Type: MODERATE l   Conscious Sedation- Needs  for 6 hours after the procedure  MAC/General-Needs  for 24 hours after procedure    N :[Pre-op Required] at [facility], [facility]  [facility] and OR for MAC sedation   (advise patient they will need a pre-op WITH IN 30 DAYS of procedure date)     Type of Procedure Scheduled:   Lower Endoscopy [Colonoscopy]    Which Colonoscopy Prep was Sent?:   STANDARD  - GO [facility] CF PATIENTS & [facility] PATIENTS NEEDS EXTENDED PREP       Informed patient they will need an adult  Y  Cannot take any type of public or medical transportation alone    Pre-Procedure Covid test to be completed at [facility] or Externally: Y  **INFORMED OF HOME TESTING & LAB OPTION**        Confirmed Nurse will call to complete assessment Y    Additional comments: N

## 2022-09-27 RX ORDER — BISACODYL 5 MG
TABLET, DELAYED RELEASE (ENTERIC COATED) ORAL
Qty: 4 TABLET | Refills: 0 | Status: SHIPPED | OUTPATIENT
Start: 2022-09-27 | End: 2023-06-16

## 2022-10-04 ENCOUNTER — HOSPITAL ENCOUNTER (OUTPATIENT)
Facility: AMBULATORY SURGERY CENTER | Age: 57
Discharge: HOME | End: 2022-10-04
Attending: SPECIALIST | Admitting: SPECIALIST
Payer: COMMERCIAL

## 2022-10-04 VITALS
HEIGHT: 68 IN | SYSTOLIC BLOOD PRESSURE: 118 MMHG | HEART RATE: 86 BPM | OXYGEN SATURATION: 95 % | DIASTOLIC BLOOD PRESSURE: 68 MMHG | RESPIRATION RATE: 16 BRPM | TEMPERATURE: 98 F | BODY MASS INDEX: 35.61 KG/M2 | WEIGHT: 235 LBS

## 2022-10-04 DIAGNOSIS — Z12.11 SCREEN FOR COLON CANCER: ICD-10-CM

## 2022-10-04 DIAGNOSIS — Z12.11 COLON CANCER SCREENING: Primary | ICD-10-CM

## 2022-10-04 LAB — COLONOSCOPY: NORMAL

## 2022-10-04 PROCEDURE — G8907 PT DOC NO EVENTS ON DISCHARG: HCPCS

## 2022-10-04 PROCEDURE — 45381 COLONOSCOPY SUBMUCOUS NJX: CPT | Mod: PT

## 2022-10-04 PROCEDURE — 45385 COLONOSCOPY W/LESION REMOVAL: CPT | Mod: PT

## 2022-10-04 PROCEDURE — 88305 TISSUE EXAM BY PATHOLOGIST: CPT | Performed by: PATHOLOGY

## 2022-10-04 PROCEDURE — G8918 PT W/O PREOP ORDER IV AB PRO: HCPCS

## 2022-10-04 RX ORDER — LIDOCAINE 40 MG/G
CREAM TOPICAL
Status: DISCONTINUED | OUTPATIENT
Start: 2022-10-04 | End: 2022-10-05 | Stop reason: HOSPADM

## 2022-10-04 RX ORDER — FENTANYL CITRATE 50 UG/ML
INJECTION, SOLUTION INTRAMUSCULAR; INTRAVENOUS PRN
Status: DISCONTINUED | OUTPATIENT
Start: 2022-10-04 | End: 2022-10-04 | Stop reason: HOSPADM

## 2022-10-04 RX ORDER — ONDANSETRON 2 MG/ML
4 INJECTION INTRAMUSCULAR; INTRAVENOUS
Status: DISCONTINUED | OUTPATIENT
Start: 2022-10-04 | End: 2022-10-05 | Stop reason: HOSPADM
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
## 2022-10-06 LAB
PATH REPORT.COMMENTS IMP SPEC: NORMAL
PATH REPORT.COMMENTS IMP SPEC: NORMAL
PATH REPORT.FINAL DX SPEC: NORMAL
PATH REPORT.GROSS SPEC: NORMAL
PATH REPORT.MICROSCOPIC SPEC OTHER STN: NORMAL
PATH REPORT.RELEVANT HX SPEC: NORMAL
PHOTO IMAGE: NORMAL

## 2022-10-06 NOTE — RESULT ENCOUNTER NOTE
Sreedhar-    Adenomatous polyps are benign, but have malignant potential.  Based on the updated polypectomy surveillance recommendations, piecemeal resection of large flat polyps warrants closer surveillance.     Recommendation: Surveillance colonoscopy in 6 months to 1 year (2023).

## 2022-10-09 ENCOUNTER — HEALTH MAINTENANCE LETTER (OUTPATIENT)
Age: 57
End: 2022-10-09

## 2023-06-15 ENCOUNTER — PATIENT OUTREACH (OUTPATIENT)
Dept: CARE COORDINATION | Facility: CLINIC | Age: 58
End: 2023-06-15
Payer: COMMERCIAL

## 2023-06-16 ENCOUNTER — OFFICE VISIT (OUTPATIENT)
Dept: FAMILY MEDICINE | Facility: CLINIC | Age: 58
End: 2023-06-16
Payer: COMMERCIAL

## 2023-06-16 VITALS
BODY MASS INDEX: 34.8 KG/M2 | HEIGHT: 68 IN | TEMPERATURE: 97.8 F | RESPIRATION RATE: 16 BRPM | HEART RATE: 71 BPM | OXYGEN SATURATION: 97 % | WEIGHT: 229.6 LBS | DIASTOLIC BLOOD PRESSURE: 96 MMHG | SYSTOLIC BLOOD PRESSURE: 148 MMHG

## 2023-06-16 DIAGNOSIS — Z12.11 SCREEN FOR COLON CANCER: ICD-10-CM

## 2023-06-16 DIAGNOSIS — I10 HYPERTENSION GOAL BP (BLOOD PRESSURE) < 140/90: ICD-10-CM

## 2023-06-16 DIAGNOSIS — Z12.5 SCREENING PSA (PROSTATE SPECIFIC ANTIGEN): ICD-10-CM

## 2023-06-16 DIAGNOSIS — E78.5 HYPERLIPIDEMIA LDL GOAL <130: Primary | ICD-10-CM

## 2023-06-16 DIAGNOSIS — F41.1 ANXIETY STATE: ICD-10-CM

## 2023-06-16 DIAGNOSIS — Z85.46 HISTORY OF PROSTATE CANCER: ICD-10-CM

## 2023-06-16 LAB
ANION GAP SERPL CALCULATED.3IONS-SCNC: 13 MMOL/L (ref 7–15)
BUN SERPL-MCNC: 12.2 MG/DL (ref 8–23)
CALCIUM SERPL-MCNC: 9.4 MG/DL (ref 8.6–10)
CHLORIDE SERPL-SCNC: 103 MMOL/L (ref 98–107)
CHOLEST SERPL-MCNC: 194 MG/DL
CREAT SERPL-MCNC: 0.77 MG/DL (ref 0.67–1.17)
DEPRECATED HCO3 PLAS-SCNC: 22 MMOL/L (ref 22–29)
GFR SERPL CREATININE-BSD FRML MDRD: >90 ML/MIN/1.73M2
GLUCOSE SERPL-MCNC: 123 MG/DL (ref 70–99)
HDLC SERPL-MCNC: 47 MG/DL
LDLC SERPL CALC-MCNC: 76 MG/DL
NONHDLC SERPL-MCNC: 147 MG/DL
POTASSIUM SERPL-SCNC: 4.1 MMOL/L (ref 3.4–5.3)
PSA SERPL DL<=0.01 NG/ML-MCNC: <0.01 NG/ML (ref 0–3.5)
SODIUM SERPL-SCNC: 138 MMOL/L (ref 136–145)
TRIGL SERPL-MCNC: 355 MG/DL

## 2023-06-16 PROCEDURE — 80061 LIPID PANEL: CPT | Performed by: PHYSICIAN ASSISTANT

## 2023-06-16 PROCEDURE — G0103 PSA SCREENING: HCPCS | Performed by: PHYSICIAN ASSISTANT

## 2023-06-16 PROCEDURE — 80048 BASIC METABOLIC PNL TOTAL CA: CPT | Performed by: PHYSICIAN ASSISTANT

## 2023-06-16 PROCEDURE — 99204 OFFICE O/P NEW MOD 45 MIN: CPT | Performed by: PHYSICIAN ASSISTANT

## 2023-06-16 PROCEDURE — 36415 COLL VENOUS BLD VENIPUNCTURE: CPT | Performed by: PHYSICIAN ASSISTANT

## 2023-06-16 RX ORDER — ATORVASTATIN CALCIUM 40 MG/1
40 TABLET, FILM COATED ORAL DAILY
COMMUNITY
Start: 2023-04-17 | End: 2023-06-16

## 2023-06-16 RX ORDER — LOSARTAN POTASSIUM AND HYDROCHLOROTHIAZIDE 12.5; 1 MG/1; MG/1
1 TABLET ORAL DAILY
Qty: 30 TABLET | Refills: 0 | Status: SHIPPED | OUTPATIENT
Start: 2023-06-16 | End: 2023-07-21

## 2023-06-16 RX ORDER — LOSARTAN POTASSIUM 100 MG/1
100 TABLET ORAL DAILY
COMMUNITY
Start: 2023-06-02

## 2023-06-16 RX ORDER — ESCITALOPRAM OXALATE 10 MG/1
10 TABLET ORAL DAILY
Qty: 30 TABLET | Refills: 0 | Status: SHIPPED | OUTPATIENT
Start: 2023-06-16 | End: 2024-04-22

## 2023-06-16 RX ORDER — ATORVASTATIN CALCIUM 40 MG/1
40 TABLET, FILM COATED ORAL DAILY
Qty: 90 TABLET | Refills: 1 | Status: SHIPPED | OUTPATIENT
Start: 2023-06-16 | End: 2024-04-22

## 2023-06-16 RX ORDER — METOPROLOL SUCCINATE 50 MG/1
50 TABLET, EXTENDED RELEASE ORAL DAILY
Qty: 90 TABLET | Refills: 0 | Status: SHIPPED | OUTPATIENT
Start: 2023-06-16 | End: 2023-09-27

## 2023-06-16 ASSESSMENT — PAIN SCALES - GENERAL: PAINLEVEL: NO PAIN (0)

## 2023-06-16 ASSESSMENT — PATIENT HEALTH QUESTIONNAIRE - PHQ9
SUM OF ALL RESPONSES TO PHQ QUESTIONS 1-9: 14
SUM OF ALL RESPONSES TO PHQ QUESTIONS 1-9: 14
10. IF YOU CHECKED OFF ANY PROBLEMS, HOW DIFFICULT HAVE THESE PROBLEMS MADE IT FOR YOU TO DO YOUR WORK, TAKE CARE OF THINGS AT HOME, OR GET ALONG WITH OTHER PEOPLE: VERY DIFFICULT

## 2023-06-16 NOTE — PATIENT INSTRUCTIONS
Referral for Behavioral Health Clinician (BHC)    During our visit, I encouraged you talk with our Behavioral Health Clinician (BHC). A BHC would be a great addition to your care team and will support your whole health!    What is a Behavioral Health Clinician (BHC)?    A BHC is a licensed mental health therapist. They can help you when behaviors, emotions, stress or worries get in the way of your life or health. Your BHC will work with you and your primary care team. Together, you'll come up with a unique care plan that works for you!         [facility]: GEM Bobo, AGUS    What will happen when I meet with a BHC?    BHCs ask questions to better understand your concerns. They will provide brief, solution-focused therapy. They can also make referrals to a specialty therapist or other services to help you reach your goals.      How do I schedule an appointment with the [facility]?    Call [phone] and ask for a C appointment. You can schedule a virtual or in-person session.    How much time will I spend with the [facility]?     First visits are 45-60 minutes.  Return visits are typically 20-30 minutes.         How does billing work?      Outpatient mental health services are billed to insurance. Most plans don't charge a second co-pay if you see your primary care provider (PCP) the same day. Please verify your coverage and ask about your copay.

## 2023-06-16 NOTE — PROGRESS NOTES
Assessment & Plan     (E78.5) Hyperlipidemia LDL goal <130  (primary encounter diagnosis)  Comment: History of hyperlipidemia.  On atorvastatin.  Patient denies any muscle aches or side effects from this medication.  Requesting refills.  Plan: atorvastatin (LIPITOR) 40 MG tablet, Lipid         panel reflex to direct LDL Non-fasting          (Z12.11) Screen for colon cancer  Comment: Patient with history of concerning polyp on colonoscopy.  Was recommended to repeat colonoscopy in 6 months to a year on 10/4/2022.  Referral placed.  Plan: Colonoscopy Screening  Referral          (I10) Hypertension goal BP (blood pressure) < 140/90  Comment: Patient with history of hypertension.  He is on losartan 100 mg.  Blood pressure is not within goal here.  Discussed with patient adding hydrochlorothiazide to his regimen and follow-up in the next 4 weeks.  Plan: losartan-hydrochlorothiazide (HYZAAR) 100-12.5         MG tablet, Basic metabolic panel  (Ca, Cl, CO2,        Creat, Gluc, K, Na, BUN), metoprolol succinate         ER (TOPROL XL) 50 MG 24 hr tablet          (F41.1) Anxiety state  Comment: History of anxiety.  The patient has had medications in the past.  Has been dealing with stress related to employment as well and has with any social outings.  Denies any thoughts of self-harm.  States his anxiety comes up as a tightness in his chest and resolves after situational stressors past.  Discussed with patient at length potential further evaluation of this including EKG and potential stress test.  Patient declined and understands that I cannot rule out other process at this time.  We will trial Lexapro.  Also discussed potential for therapy follow-up.  Plan: escitalopram (LEXAPRO) 10 MG tablet          (Z12.5) Screening PSA (prostate specific antigen)  Comment: Discussed screening prostate antigen.  Patient has a history of prostate cancer.  Has not followed with urology in the last 1 to 2 years  Plan: PSA, screen,  Adult Urology  Referral          (Z85.46) History of prostate cancer  Comment: History of prostate cancer.  Has not recently follow-up with urology.  Plan: Adult Urology  Referral            Depression Screening Follow Up        6/16/2023     8:11 AM   PHQ   PHQ-9 Total Score 14   Q9: Thoughts of better off dead/self-harm past 2 weeks Not at all         Follow Up Actions Taken  Started patient on anti-depressant.     Follow-up in 4 weeks.    Gregory Mullins PA-C  [facility]   Reece is a 57 year old, presenting for the following health issues:  Follow Up, Refill Request, and LAB REQUEST         View : No data to display.              History of Present Illness       Hyperlipidemia:  He presents for follow up of hyperlipidemia.  He is taking medication to lower cholesterol. He is having myalgia or other side effects to statin medications.    Hypertension: He presents for follow up of hypertension.  He does check blood pressure  regularly outside of the clinic. Outside blood pressures have been over 140/90. He follows a low salt diet.     He eats 0-1 servings of fruits and vegetables daily.He consumes 0 sweetened beverage(s) daily.He exercises with enough effort to increase his heart rate 10 to 19 minutes per day.  He exercises with enough effort to increase his heart rate 3 or less days per week.   He is taking medications regularly.    Today's PHQ-9         PHQ-9 Total Score: 14    PHQ-9 Q9 Thoughts of better off dead/self-harm past 2 weeks :   Not at all    How difficult have these problems made it for you to do your work, take care of things at home, or get along with other people: Very difficult     Patient presents for medication refills.  The patient is on losartan 100 mg as well as metoprolol.  Blood pressure not within goal.  Patient is also on statin medication.  He denies missing any doses of medications.  No significant leg swelling.  He has history  "of anxiety and notes that his anxiety manifests at times with tightness in his chest during social situations and resolves afterwards.  Patient does consume alcohol 4 nights per week having 2-3 beverages.  Does consume 2 cups of coffee per day.    Patient concerned that his anxiety has been worsening over time.  He had lost his job at the start of COVID pandemic and has had ongoing anxiety issues since then.  Has not followed with a therapist.  No thoughts of self-harm.    History of colon polyp that had to be piecemeal resected.  Recommendation for repeat colonoscopy in 6 to 12 months.    History of prostate cancer.  Has not followed with urology recently.    Patient requesting repeat PSA.      Review of Systems   Constitutional, HEENT, cardiovascular, pulmonary, gi and gu systems are negative, except as otherwise noted.      Objective    BP (!) 148/96   Pulse 71   Temp 97.8  F (36.6  C) (Tympanic)   Resp 16   Ht 1.727 m (5' 8\")   Wt 104.1 kg (229 lb 9.6 oz)   SpO2 97%   BMI 34.91 kg/m    Body mass index is 34.91 kg/m .  Physical Exam   GENERAL: alert, no distress and over weight  NECK: no adenopathy, no asymmetry, masses, or scars and thyroid normal to palpation  RESP: lungs clear to auscultation - no rales, rhonchi or wheezes  CV: regular rate and rhythm, normal S1 S2, no S3 or S4, no murmur, click or rub, no peripheral edema and peripheral pulses strong  ABDOMEN: soft, nontender, no hepatosplenomegaly, no masses and bowel sounds normal  MS: no gross musculoskeletal defects noted, no edema  PSYCH: mentation appears normal, affect normal/bright and slightly anxious          "

## 2023-06-29 ENCOUNTER — PATIENT OUTREACH (OUTPATIENT)
Dept: CARE COORDINATION | Facility: CLINIC | Age: 58
End: 2023-06-29
Payer: COMMERCIAL

## 2023-07-20 DIAGNOSIS — I10 HYPERTENSION GOAL BP (BLOOD PRESSURE) < 140/90: ICD-10-CM

## 2023-07-21 RX ORDER — LOSARTAN POTASSIUM AND HYDROCHLOROTHIAZIDE 12.5; 1 MG/1; MG/1
1 TABLET ORAL DAILY
Qty: 30 TABLET | Refills: 0 | Status: SHIPPED | OUTPATIENT
Start: 2023-07-21 | End: 2023-08-24

## 2023-07-25 ENCOUNTER — TELEPHONE (OUTPATIENT)
Dept: FAMILY MEDICINE | Facility: CLINIC | Age: 58
End: 2023-07-25
Payer: COMMERCIAL

## 2023-08-10 ENCOUNTER — TRANSFERRED RECORDS (OUTPATIENT)
Dept: HEALTH INFORMATION MANAGEMENT | Facility: CLINIC | Age: 58
End: 2023-08-10

## 2023-08-14 ENCOUNTER — OFFICE VISIT (OUTPATIENT)
Dept: URGENT CARE | Facility: URGENT CARE | Age: 58
End: 2023-08-14
Payer: OTHER MISCELLANEOUS

## 2023-08-14 ENCOUNTER — TELEPHONE (OUTPATIENT)
Dept: FAMILY MEDICINE | Facility: CLINIC | Age: 58
End: 2023-08-14
Payer: COMMERCIAL

## 2023-08-14 VITALS
RESPIRATION RATE: 18 BRPM | TEMPERATURE: 98 F | DIASTOLIC BLOOD PRESSURE: 92 MMHG | OXYGEN SATURATION: 98 % | WEIGHT: 225 LBS | HEART RATE: 87 BPM | BODY MASS INDEX: 34.21 KG/M2 | SYSTOLIC BLOOD PRESSURE: 143 MMHG

## 2023-08-14 DIAGNOSIS — M54.41 ACUTE BILATERAL LOW BACK PAIN WITH BILATERAL SCIATICA: Primary | ICD-10-CM

## 2023-08-14 DIAGNOSIS — I10 ESSENTIAL HYPERTENSION: ICD-10-CM

## 2023-08-14 DIAGNOSIS — M54.42 ACUTE BILATERAL LOW BACK PAIN WITH BILATERAL SCIATICA: Primary | ICD-10-CM

## 2023-08-14 DIAGNOSIS — M51.369 DDD (DEGENERATIVE DISC DISEASE), LUMBAR: ICD-10-CM

## 2023-08-14 PROBLEM — C61 MALIGNANT NEOPLASM OF PROSTATE (H): Status: ACTIVE | Noted: 2020-07-27

## 2023-08-14 PROBLEM — M50.30 DDD (DEGENERATIVE DISC DISEASE), CERVICAL: Status: ACTIVE | Noted: 2020-07-27

## 2023-08-14 PROBLEM — K21.9 GASTRIC REFLUX: Status: ACTIVE | Noted: 2020-07-27

## 2023-08-14 PROBLEM — F43.29 STRESS AND ADJUSTMENT REACTION: Status: ACTIVE | Noted: 2021-07-14

## 2023-08-14 PROBLEM — M54.12 CERVICAL RADICULOPATHY: Status: ACTIVE | Noted: 2021-07-14

## 2023-08-14 PROBLEM — M17.11 ARTHRITIS OF RIGHT KNEE: Status: ACTIVE | Noted: 2020-07-27

## 2023-08-14 PROCEDURE — 99213 OFFICE O/P EST LOW 20 MIN: CPT | Performed by: NURSE PRACTITIONER

## 2023-08-14 RX ORDER — CYCLOBENZAPRINE HCL 5 MG
5 TABLET ORAL 2 TIMES DAILY PRN
Qty: 10 TABLET | Refills: 0 | Status: SHIPPED | OUTPATIENT
Start: 2023-08-14

## 2023-08-14 ASSESSMENT — PAIN SCALES - GENERAL: PAINLEVEL: SEVERE PAIN (7)

## 2023-08-14 NOTE — TELEPHONE ENCOUNTER
Reason for Call:  Appointment Request    Patient requesting this type of appt:  injured his back /numbness at the top of his feet, having pain in his knees and tailbone pain from injury at work Thursday. He states he already spoke with a nurse     Requested provider:  any    Reason patient unable to be scheduled: Not within requested timeframe    When does patient want to be seen/preferred time: Same day    Comments: patient wondering if he could be worked in today     Could we send this information to you in [facility] or would you prefer to receive a phone call?:   Patient would prefer a phone call   Okay to leave a detailed message?: Yes at [phone]     Call taken on 8/14/2023 at 8:41 AM by Ratna Menendez    
No openings in the clinic today. Dr Rich is only in 1 day this week, but his schedule is full. I called and informed the patient that he should come into urgent care.[facility]    
on the discharge service for the patient. I have reviewed and made amendments to the documentation where necessary.

## 2023-08-14 NOTE — LETTER
REPORT OF WORK COMP    [facility] [place]  [address] [facility] [id number]      PATIENT DATA    Employee Name: Reece Washington      : 1965     #: [id number]    Work related injury: Yes  Date of injury:8/10/2023   Today's date: 2023    Date of first visit: 2023    PROVIDER EVALUATION: Please fill in as needed.  Please give copy to employee for employer.    1. Diagnosis: lumbar strain with bilateral sciatica    2. Treatment: rest, no lifting, no prolonged sitting, no forward bending, no pushing or pulling.  3. Medication: Flexeril, ibuprofen   NOTE: When ordering a medication, [place] Rules require Work Comp or WC on prescriptions.    4. No work from 2023 to until seen by orthopedic specialty and cleared. .  5. Return to work date: likely 1-2 weeks   WITH RESTRICTIONS? Yes, with work restrictions: depending on orthopedic recommendation   DURATION OF LIMITATIONS: will need clearance from primary provider and or orthopedic specialty.       RESTRICTIONS: Unlimited unless listed.  Restrictions apply to home and leisure also.  If work restrictions is not available, the employee is totally disabled.    Provider comments: patient seen through urgent care will need to follow up with his primary care provider and orthopedic referral placed. Along with physical therapy.     Next appointment: AZEEM Segura CNP    CC: Employer, Managed Care Plan/Payor, Patient

## 2023-08-14 NOTE — PROGRESS NOTES
Assessment & Plan     1. Acute bilateral low back pain with bilateral sciatica  Patient given a letter for work with restrictions related to no forward bending, prolonged sitting, pushing or pulling.  Recommend patient follow-up with orthopedic specialty for further evaluation due to symptoms of neuropathy and sciatica.  We will also send patient through physical therapy.  Patient was given a course of Flexeril to help we discussed side effects including drowsiness and recommendations not to drive with this medication.  He may also continue Tylenol and/or ibuprofen as needed for discomfort.  Patient will follow-up with his primary care provider as well.  Plan of care discussed with patient. They verbalized agreement and agree with course of treatment prescribed. A printed summary was given including instructions and medications.     The use of Dragon/PowerMIC dictation services may have been used to construct the content in this note; any grammatical or spelling errors are non-intentional. Please contact the author of this note directly if you are in need of any clarification.        - Physical Therapy Referral; Future  - Orthopedic  Referral; Future  - cyclobenzaprine (FLEXERIL) 5 MG tablet; Take 1 tablet (5 mg) by mouth 2 times daily as needed for muscle spasms  Dispense: 10 tablet; Refill: 0    2. Essential hypertension  Continue to monitor closely at home    3. DDD (degenerative disc disease), lumbar        REMA Cabral [facility] [place]    Subjective     Reece is a 58 year old male who presents to clinic today for the following health issues:  Chief Complaint   Patient presents with    Urgent Care    Work Comp    Musculoskeletal Problem     Per patient while at work he was dragging heavy parts and feels movement he may have pulled a muscle now having severe pain.      HPI    Patient presents to clinic states that while at work he was pulling heavy equipment from the  back of his work truck when he felt pain in low back area since this time symptoms have increased in severity he is now complaining of bilateral neuropathy down the posterior side of both legs right greater than left and tops of both feet.  Again right greater than left.  Denies loss of bowel or bladder.  States he was seen through occupational therapy via his employer.  He does not feel this is helping.  States that he has been using ice over the weekend and Tylenol as needed symptoms were improving slightly however when he tried to go back to work today after sitting in his truck for a short period of time noted that increased numbness and tingling especially in the right foot and pain in the low back region that radiated as noted above.  The symptoms worsened.    Patient does have a history of low back pain past MRI indicated mild disc bulging L4-L5 and L5-S1 regions please see MRI dated 7/25/2009.  Patient states that she typically does not have back pain.    Blood pressure slightly elevated today as well patient is on hypertensive medications will follow this at home.  Denies any symptoms of chest pain or headache.    Review of Systems  Constitutional, HEENT, cardiovascular, pulmonary, gi and gu systems are negative, except as otherwise noted.      Objective    BP (!) 143/92   Pulse 87   Temp 98  F (36.7  C) (Tympanic)   Resp 18   Wt 102.1 kg (225 lb)   SpO2 98%   BMI 34.21 kg/m    Physical Exam   GENERAL: alert, moderate distress, and over weight  NECK: no adenopathy, no asymmetry, masses, or scars and thyroid normal to palpation  RESP: lungs clear to auscultation - no rales, rhonchi or wheezes  CV: regular rate and rhythm, normal S1 S2, no S3 or S4, no murmur, click or rub, no peripheral edema and peripheral pulses strong  ABDOMEN: soft, nontender, no hepatosplenomegaly, no masses and bowel sounds normal  MS: Pain with palpation over L5-S1 region and lower coccyx area pain increased with forward  flexion.  Patient states pain worsens with sitting.    SKIN: no suspicious lesions or rashes  NEURO: Normal strength and tone, mentation intact and speech normal

## 2023-08-14 NOTE — PATIENT INSTRUCTIONS
Acute Back/Neck Pain: Self Care at Home Instructions  Conservative Treatment  Remaining active supports a quicker recovery, keep moving. (ex. Walking, increase time & speed as tolerated).  Bed rest is not recommended. Minimize sitting. Do not remain in one position for extended periods of time.  Maintain routine activity with attention to correct posture; stop aggravating activities.  Over-the-counter pain medications for short term symptom control. (See below)  Muscle relaxants are sometimes helpful for few days, but may cause drowsiness. (See below)  Cold packs or heat based upon your preference.  Most people improve within 2 weeks; most have significant improvement within 4 weeks.  If symptoms worsen or you experience numbness, contact your provider immediately.    Medications for Pain Relief  Recommended over-the-counter non-steroidal anti-inflammatories:     Ibuprofen (Advil, Motrin) 800 mg. three times a day as needed for pain      OR   Naproxen Sodium (Aleve) 220-440 mg. two times per day as needed for pain    If you have been prescribed a muscle relaxant (*cyclobenzapine 5 mg.)  Take    - 1 tablet at bedtime  *May cause drowsiness. Do not drive when taking this medication.

## 2023-08-15 NOTE — TELEPHONE ENCOUNTER
SPINE PATIENTS - NEW PROTOCOL PREVISIT    RECORDS RECEIVED FROM: Internal   REASON FOR VISIT: Acute bilateral low back pain with bilateral sciatica    Date of Appt: 08/25/2023   NOTES (FOR ALL VISITS) STATUS DETAILS   OFFICE NOTE from referring provider Internal 08/14/2023 Dr Borjas [facility] urgent care   OFFICE NOTE from other specialist N/A    DISCHARGE SUMMARY from hospital N/A    DISCHARGE REPORT from ER N/A    OPERATIVE REPORT N/A    EMG REPORT N/A    MEDICATION LIST N/A    IMAGING  (FOR ALL VISITS)     MRI (HEAD, NECK, SPINE) N/A    XRAY (SPINE) *NEUROSURGERY* N/A    CT (HEAD, NECK, SPINE) N/A

## 2023-08-19 ENCOUNTER — HEALTH MAINTENANCE LETTER (OUTPATIENT)
Age: 58
End: 2023-08-19

## 2023-08-22 ENCOUNTER — TRANSFERRED RECORDS (OUTPATIENT)
Dept: HEALTH INFORMATION MANAGEMENT | Facility: CLINIC | Age: 58
End: 2023-08-22

## 2023-08-24 DIAGNOSIS — I10 HYPERTENSION GOAL BP (BLOOD PRESSURE) < 140/90: ICD-10-CM

## 2023-08-24 RX ORDER — LOSARTAN POTASSIUM AND HYDROCHLOROTHIAZIDE 12.5; 1 MG/1; MG/1
1 TABLET ORAL DAILY
Qty: 30 TABLET | Refills: 0 | Status: SHIPPED | OUTPATIENT
Start: 2023-08-24 | End: 2023-09-27

## 2023-08-25 ENCOUNTER — PRE VISIT (OUTPATIENT)
Dept: NEUROSURGERY | Facility: CLINIC | Age: 58
End: 2023-08-25

## 2023-08-25 ENCOUNTER — OFFICE VISIT (OUTPATIENT)
Dept: NEUROSURGERY | Facility: CLINIC | Age: 58
End: 2023-08-25
Attending: NURSE PRACTITIONER
Payer: OTHER MISCELLANEOUS

## 2023-08-25 VITALS
HEART RATE: 106 BPM | SYSTOLIC BLOOD PRESSURE: 160 MMHG | WEIGHT: 225 LBS | HEIGHT: 68 IN | DIASTOLIC BLOOD PRESSURE: 98 MMHG | BODY MASS INDEX: 34.1 KG/M2

## 2023-08-25 DIAGNOSIS — M54.41 ACUTE BILATERAL LOW BACK PAIN WITH BILATERAL SCIATICA: ICD-10-CM

## 2023-08-25 DIAGNOSIS — M54.42 ACUTE BILATERAL LOW BACK PAIN WITH BILATERAL SCIATICA: ICD-10-CM

## 2023-08-25 PROCEDURE — 99203 OFFICE O/P NEW LOW 30 MIN: CPT | Performed by: PHYSICIAN ASSISTANT

## 2023-08-25 ASSESSMENT — PAIN SCALES - GENERAL: PAINLEVEL: SEVERE PAIN (6)

## 2023-08-25 NOTE — PROGRESS NOTES
Neurosurgery Consult    HPI    Mr. Washington is a 58-year-old male who presents to clinic with 2 weeks of low back pain, tailbone pain and bilateral intermittent numbness going down his legs.  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  He has not yet begun physical therapy.  He has met with her primary care doctor and tried some muscle relaxant which is also sedating.    His main symptoms are tailbone pain when he is bending and sitting down, its also about there is some when he is going to the bathroom.  And numbness on the right than the left in an L5 distribution approximately also extending into all of his foot.    He denies bowel or bladder symptoms or saddle anesthesia.    Medical history  Obesity  GERD      Social history  Works as a       B/P: 160/98, T: Data Unavailable, P: 106, R: Data Unavailable       Exam    Alert and oriented no acute distress  Bilateral lower extremities with 5/5 strength  Reflexes absent patella/ankle  Negative straight leg raise bilaterally  Negative ankle clonus negative Babinski bilaterally  Lumbar spine nontender to palpation  Able to stand on heels and toes  Gait is normal    Imaging    No recent imaging review    Assessment    Acute back pain, acute paresthesias in the lower bilateral lower extremities right worse than left.  Acute tailbone pain.    Plan:      I recommend the patient obtain a lumbar MRI given that he is having numbness fairly frequently in his bilateral lower extremities right worse than left.  I will contact him with the results when they are available.      Addendum 9/6/2023    Lumbar MRI demonstrated a right-sided disc bulge at L5-S1 possible impinging on the right S1 nerve, this could explain some of the symptoms in his right leg.  I wrote the patient a note for light duty for the next 1 month, if not improving after that he should follow-up with us for further evaluation.  He is going to  continue with physical therapy at this time.  He is not particular interested in trying a steroid taper orally, or an epidural steroid injection.  He will follow-up with up with us in a month if is not improving.

## 2023-08-25 NOTE — LETTER
8/25/2023         RE: Reece Washington  [address] [facility] [address]        Dear Colleague,    Thank you for referring your patient, Reece Washington, to the [facility]UROS[facility]GERY [facility] [place]. Please see a copy of my visit note below.    Neurosurgery Consult    HPI    Mr. Washington is a 58-year-old male who presents to clinic with 2 weeks of low back pain, tailbone pain and bilateral intermittent numbness going down his legs.  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  He has not yet begun physical therapy.  He has met with her primary care doctor and tried some muscle relaxant which is also sedating.    His main symptoms are tailbone pain when he is bending and sitting down, its also about there is some when he is going to the bathroom.  And numbness on the right than the left in an L5 distribution approximately also extending into all of his foot.    He denies bowel or bladder symptoms or saddle anesthesia.    Medical history  Obesity  GERD      Social history  Works as a       B/P: 160/98, T: Data Unavailable, P: 106, R: Data Unavailable       Exam    Alert and oriented no acute distress  Bilateral lower extremities with 5/5 strength  Reflexes absent patella/ankle  Negative straight leg raise bilaterally  Negative ankle clonus negative Babinski bilaterally  Lumbar spine nontender to palpation  Able to stand on heels and toes  Gait is normal    Imaging    No recent imaging review    Assessment    Acute back pain, acute paresthesias in the lower bilateral lower extremities right worse than left.  Acute tailbone pain.    Plan:      I recommend the patient obtain a lumbar MRI given that he is having numbness fairly frequently in his bilateral lower extremities right worse than left.  I will contact him with the results when they are available.        Again, thank you for allowing me to participate in the care of your patient.         Sincerely,        Rell Nugent PA-C

## 2023-08-25 NOTE — NURSING NOTE
"Reece Washington's goals for this visit include:   Chief Complaint   Patient presents with    New Patient     Back pain// bilateral sciatic symptoms//. Numbness // spasms during stretching \"odd feeling \"           He requests these members of his care team be copied on today's visit information: yes    PCP: Sreedhar Rich    Referring Provider:  Margo Segura, APRN CNP  [address] [place] [address]    BP (!) 160/98   Pulse 106   Ht 1.727 m (5' 8\")   Wt 102.1 kg (225 lb)   BMI 34.21 kg/m      Do you need any medication refills at today's visit? No  SALLY Montes., CMA ([facility])      "

## 2023-08-31 ENCOUNTER — HOSPITAL ENCOUNTER (OUTPATIENT)
Dept: MRI IMAGING | Facility: CLINIC | Age: 58
Discharge: HOME | End: 2023-08-31
Attending: PHYSICIAN ASSISTANT | Admitting: PHYSICIAN ASSISTANT
Payer: OTHER MISCELLANEOUS

## 2023-08-31 DIAGNOSIS — M54.41 ACUTE BILATERAL LOW BACK PAIN WITH BILATERAL SCIATICA: ICD-10-CM

## 2023-08-31 DIAGNOSIS — M54.42 ACUTE BILATERAL LOW BACK PAIN WITH BILATERAL SCIATICA: ICD-10-CM

## 2023-08-31 PROCEDURE — 72148 MRI LUMBAR SPINE W/O DYE: CPT

## 2023-09-06 ENCOUNTER — TELEPHONE (OUTPATIENT)
Dept: NEUROSURGERY | Facility: CLINIC | Age: 58
End: 2023-09-06

## 2023-09-06 NOTE — TELEPHONE ENCOUNTER
[facility] Call Center    Phone Message    May a detailed message be left on voicemail: no     Reason for Call: Pt is looking to get the next steps , has completed MRI 08/31 please call pt to discuss.    Action Taken: Message routed to:  [facility] ([facility]):  neurosurgery     Travel Screening: Not Applicable

## 2023-09-06 NOTE — LETTER
September 7, 2023      RE: Reece ALVARADO Nerase  [address] [address]        To Whom It May Concern,        Reece may return to work with the following: limited to light duty - lifting no greater than 20 pounds for until 10/8, avoid excessive bending and twisting. He should also avoid long periods of driving. After 10/8, he may resume normal duties or return to clinic to be evaluated further. He will also continue with physical therapy at this time.         Sincerely,        Electronically signed   Rell Nugent PA-C

## 2023-09-07 ENCOUNTER — TRANSFERRED RECORDS (OUTPATIENT)
Dept: HEALTH INFORMATION MANAGEMENT | Facility: CLINIC | Age: 58
End: 2023-09-07

## 2023-09-07 NOTE — TELEPHONE ENCOUNTER
The patient is request that his work letter be modified to include that he will be continuing PT.  The patient is requesting the modified letter be place in his MyChart.  Please advise.  Thank you.

## 2023-09-07 NOTE — TELEPHONE ENCOUNTER
New letter generated to include that pt is to continue physical therapy at this time. Writer left detailed message informing pt that new letter should be available via my chart. He may call or send message if anything further is needed.       Vale Ernandez, RNCC  Neurology/Neurosurgery

## 2023-09-11 ENCOUNTER — TELEPHONE (OUTPATIENT)
Dept: NEUROSURGERY | Facility: CLINIC | Age: 58
End: 2023-09-11

## 2023-09-11 NOTE — TELEPHONE ENCOUNTER
[facility] Call Center    Phone Message    May a detailed message be left on voicemail: yes     Reason for Call: Form or Letter   Type or form/letter needing completion:   Employeer is requesting an updated letter.  They are needing to have it written out with how many minutes you are allowed to drive before he needs to take a break to get the numbness to go away before he can drive again.     Please call Reece back to discuss.       Provider: Rell Nugent   Date form needed: ASAP  Once completed: Fax form to: SEND TO [employer] again.     Action Taken: Message routed to:  Adult Clinics: Neurology p [id number]    Travel Screening: Not Applicable

## 2023-09-11 NOTE — LETTER
September 13, 2023      Reece Washington  [address] [facility] [address]        To Whom It May Concern,     Reece Washington is an established patient of [facility] whom I see at the [facility]. I am advising that he refrain from all work duties until at least 10/8/23. If you have any questions or concerns with this request, please have Reece reach out to us to discuss.       Sincerely,        Rell Nugent PA-C

## 2023-09-11 NOTE — LETTER
September 12, 2023      RE: Reece ALVARADO Nerase  [address] [address] [address]        To Whom It May Concern,         Reece may return to work with the following: limited to light duty - lifting no greater than 20 pounds until 10/8, avoid excessive bending and twisting. Please allow 10-15 minute breaks every 30 minutes while driving. After 10/8, he may resume normal duties or return to clinic to be evaluated further. He will also continue with physical therapy at this time.              Sincerely,        Rell Nugent PA-C

## 2023-09-12 NOTE — TELEPHONE ENCOUNTER
Writer left detailed message informing pt that work letter was updated to include 10-15min breaks every 30 minutes when driving. Available via my chart. If his employer will not approve this, then pt was advised to contact the clinic. He may need another follow-up appt with provider and new work letter allowing him to be off of work until the next follow-up visit.       Rell Nugent PA-C  You2 hours ago (11:20 AM)     OC  We can write that and see if it is acceptable to his employer, if he needs more time off of work okay to be off until her next follow-up as long as he is doing some therapies at the same time.         Vale Ernandez, RNCC  Neurology/Neurosurgery

## 2023-09-13 NOTE — TELEPHONE ENCOUNTER
[facility] Call Center    Phone Message    May a detailed message be left on voicemail: yes     Reason for Call: Form or Letter   Type or form/letter needing completion: The patient stated his work is requiring a new letter stating he will need to be off of work until his next follow up.  He stated he tried to return to work but it just was not happening for him, and he did call in today.  He is requesting a call back today to discuss.  Thank you.   Provider: Rell Nugent  Date form needed: Today  Once completed: Send to employer and place in patient's [facility].      Action Taken: Message routed to:  Adult Clinics: Neurology p [id number]    Travel Screening: Not Applicable

## 2023-09-13 NOTE — CONFIDENTIAL NOTE
RN returned the pt's call to discuss his concerns below. His boss will not accept the letter and basically wants him 100% or not at all. 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   He just started this job on 7/11/23 and does not qualify for any [place] benefits so he is not sure how to proceed at this point. He is participating in physical therapy right now and goes about 3 times a week.     Rell does not have any openings in [place] until 11/16. He knows that he cannot go back at this time but cannot wait until then to come up with a plan.     He would like a letter stating that he needs to be out of work until 10/8 and in the meantime, we will see what Rell suggests. Should he see a neurosurgeon or does he have any openings at another location sooner?    Theresa Murillo RN Care Coordinator   Neurology/Neurosurgery/PM&R/ Pain Management

## 2023-09-15 ENCOUNTER — TELEPHONE (OUTPATIENT)
Dept: NEUROLOGY | Facility: CLINIC | Age: 58
End: 2023-09-15

## 2023-09-15 NOTE — TELEPHONE ENCOUNTER
SPINE PATIENTS - NEW PROTOCOL PREVISIT    RECORDS RECEIVED FROM: Internal   REASON FOR VISIT: Spine    Date of Appt: 09/29/2023   NOTES (FOR ALL VISITS) STATUS DETAILS   OFFICE NOTE from referring provider Internal 08/25/2023 Rell Nugent [facility]    OFFICE NOTE from other specialist Internal 08/14/2023 Dr Borjas [facility] urgent care    DISCHARGE SUMMARY from hospital N/A    DISCHARGE REPORT from ER N/A    OPERATIVE REPORT N/A    EMG REPORT N/A    MEDICATION LIST N/A    IMAGING  (FOR ALL VISITS)     MRI (HEAD, NECK, SPINE) Internal 08/31/2023 lumbar spine  07/25/2009 lumbar spine   XRAY (SPINE) *NEUROSURGERY* N/A    CT (HEAD, NECK, SPINE) N/A

## 2023-09-15 NOTE — TELEPHONE ENCOUNTER
Pt returned call and confirmed he recvd the work letter, we also booked consult with Dr. Keene on 9/29 see message below.

## 2023-09-15 NOTE — CONFIDENTIAL NOTE
RN called the pt to relay Rell's message below and offer an appt with one of our med-spine doctors but there was no answer. A message was left on his VM requesting a call back.     He could schedule with Dr Amin on 9/29 if he is open to that.    A letter for work was generated and sent to the pt via [employer].     Theresa Murillo RN Care Coordinator   Neurology/Neurosurgery/PM&R/ Pain Management

## 2023-09-15 NOTE — TELEPHONE ENCOUNTER
Rell Nugent PA-C   to Me   OC    9/13/23  3:06 PM   I had offered him a steroid medication or an injection in the past, he declined both of these.  Agree with physical therapy, if he wanted to meet with Dr. Amin or someone in physiatry to discuss nonsurgical options that would be an option as well.  He would need to least try an injection prior to any surgical discussion.  And I do not think he will likely need surgery.    May be Dr. Rich in occupational medicine may be helpful as well.

## 2023-09-15 NOTE — TELEPHONE ENCOUNTER
[facility] Call Center    Phone Message    May a detailed message be left on voicemail: yes     Reason for Call: Jose M Alcantara from [facility] is calling to speak with Rell Nugent's team regarding return to work questions for this patient. Can someone please call Daren back to advise? Thank you! Gisela phone number is [phone]    Action Taken: Neurology [place]    Travel Screening: Not Applicable

## 2023-09-18 NOTE — TELEPHONE ENCOUNTER
Writer contacted physical therapist, Daren Alcantara, who wanted to relay pt's concerns regarding light duty restrictions. Pt was unable to return to work last week due to ongoing symptoms. Daren was informed that pt contacted the clinic and is now off of work until 10/8. He will also see PM&R on 9/29 for consultation. Physical therapist verbalized understanding and no further questions at this time.       Vale Ernandez, RNCC  Neurology/Neurosurgery

## 2023-09-20 ENCOUNTER — TRANSFERRED RECORDS (OUTPATIENT)
Dept: HEALTH INFORMATION MANAGEMENT | Facility: CLINIC | Age: 58
End: 2023-09-20

## 2023-09-25 ENCOUNTER — DOCUMENTATION ONLY (OUTPATIENT)
Dept: NEUROSURGERY | Facility: CLINIC | Age: 58
End: 2023-09-25

## 2023-09-25 NOTE — PROGRESS NOTES
Faxed Form September 25, 2023 to fax number [phone]    Right Fax confirmed at 2:30 PM    Aquilino Reyes

## 2023-09-27 DIAGNOSIS — I10 HYPERTENSION GOAL BP (BLOOD PRESSURE) < 140/90: ICD-10-CM

## 2023-09-27 NOTE — LETTER
September 28, 2023    Reece Washington  [address] [address] [facility] [address]    Dear Reece,       We recently received a refill request for losartan-hydrochlorothiazide and metoprolol.  We have refilled this for a one time 30 day supply only because you are due for a:    Blood pressure/medication check office visit      Please call at your earliest convenience so that there will not be a delay with your future refills.          Thank you,   Your [facility] Team/  [phone]

## 2023-09-28 RX ORDER — LOSARTAN POTASSIUM AND HYDROCHLOROTHIAZIDE 12.5; 1 MG/1; MG/1
1 TABLET ORAL DAILY
Qty: 30 TABLET | Refills: 0 | Status: SHIPPED | OUTPATIENT
Start: 2023-09-28 | End: 2023-11-02

## 2023-09-28 RX ORDER — METOPROLOL SUCCINATE 50 MG/1
50 TABLET, EXTENDED RELEASE ORAL DAILY
Qty: 30 TABLET | Refills: 0 | Status: SHIPPED | OUTPATIENT
Start: 2023-09-28 | End: 2023-11-02

## 2023-09-28 NOTE — PROGRESS NOTES
"HISTORY OF PRESENT ILLNESS:  Reece Washington is a 58 year old male who presents with a chief complaint of low back and lower extremity pain.     Pain began 8/10/2023 and is associated with a work-related injury.  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  He had been pulling/lifting items out of his van to a marshall at the end of the truck and felt a \"tearing feeling \"and thought that he had perhaps pulled something.  He has had persistent, ongoing pain despite attempting to return to work has not been able to do so and has been written off until 10/8/2023.  He has continued with physical therapy at [facility] and seems to have 40 to 60% improvement in symptoms overall per their notes.  He seems to be able to lift up to 20 pounds with therapy.  He states that he did attempt to return to work with light duty or other types of restrictions but has had difficulty working with his employer on placement.  He feels somewhat at a loss on what to do.  He has been seen at urgent care, PCP, and our neurosurgery spine clinic.  MRI lumbar spine shows right L5-S1 central disc protrusion with associated focal mass defect on the descending S1 nerve root.  He was prescribed Flexeril and recommended Tylenol without significant proven his pain and symptoms.  Flexeril makes him quite tired.    He denies significant, chronic pain or spine issues.  He reports occasional intermittent back pain which she feels is age-appropriate with flareups occasionally that lasts for a few days and get better on their own.    He has been at his current work for only a month.  Since COVID he has had some difficulty finding more consistent work but prior to that had worked for [employer] for about 16 years.     Currently, he localizes the majority of his pain to his sacral and tailbone area with pain but mostly numbness/tingling radiating to the right much more than the left lower extremity in an L5/S1 distribution.  He denies " bowel/bladder issues, saddle anesthesia or significant weakness.  He does state that his right knee feels unstable at times. Pain is described as constant burning and numb in nature. Pain is reported as 7/10 at rest today and up to 7/10 at worst in the last week. Symptoms are worse with generalized activities; and improved with relative rest, PT. He does report pain-related sleep disturbance.       PRIOR INJURIES/TREATMENT:   Ice/Heat: +  Physical Therapy:   Current PT at [facility]       - Current Pain Medications -   Tylenol prn     - Prior/Trialed Pain Medications -   Flexeril -makes him tired, does not help significantly with symptoms    Prior Procedures:  Date    Procedure   Improvement (%)  None              Prior Related Surgery: None   Other (acupuncture, OMT, CMM, TENS, DME, etc.): None    Specialists Seen - (with most recent, available notes and clinic visits reviewed)   1. Neurosurgery - Rell Nugent PA-C      IMAGING - reviewed   08/31/23 MRI Lumbar spine  IMPRESSION:  1. Multilevel degenerative changes, as described.  2. At L5-S1, there is a right central disc protrusion with associated focal mass effect upon and posterior displacement of the traversing right S1 nerve root. Please correlate clinically for possible right S1 radiculopathy.  3. No high-grade central spinal canal stenosis.  4. Mild/mild to moderate multilevel neural foraminal stenosis, as  described.  5. Partially visualized relatively large nonspecific cystic lesion of  the right kidney.    Review Of Systems:  I am responding to those symptoms which are directly relevant to the specific indication for my consultation. I recommend that the patient follow up with their primary or referring provider to pursue any other symptoms which may be of concern.       Medical History:  He  has a past medical history of Arthritis of right knee (7/27/2020), Back pain, Blunt eye trauma, Hypertension, Neck pain, and Pure hypercholesterolemia.     He  has  "a past surgical history that includes TOOTH EXTRACTION W/FORCEP; surgical history of -  (1981); orthopedic surgery; Colonoscopy (10/25/2013); Colonoscopy with CO2 insufflation (N/A, 10/4/2022); and Colonoscopy (N/A, 10/4/2022).    Family History  His family history includes Cancer in his maternal grandmother; Cerebrovascular Disease in his maternal grandfather; Heart Disease in his father; Hypertension in his father and mother; Lipids in his father.     Social History:  Work: Delivers semitruck parts  Current living situation: Lives in [place] with spouse  He  reports that he has never smoked. He has never used smokeless tobacco. He reports current alcohol use. He reports that he does not use drugs.        Current Medications:   He has a current medication list which includes the following prescription(s): atorvastatin, cyclobenzaprine, escitalopram, fish oil-omega-3 fatty acids, losartan, losartan-hydrochlorothiazide, and metoprolol succinate er.     Allergies:   No Known Allergies    PHYSICAL EXAMINATION:  /81   Pulse 87   Ht 1.727 m (5' 8\")   Wt 102.1 kg (225 lb)   BMI 34.21 kg/m     General: Pleasant, straightforward, WDWN individual.  Mental Status: Pleasant, direct, appropriate mood and affect  Resp: breathing is unlabored without audible wheeze  Vascular: No visible cyanosis, no venous stasis changes  Heme: No visible ecchymosis or erythema on extremities  Skin: No notable rash    Neurologic:  Strength: All major muscle groups of the bilateral lower extremities have normal and symmetric muscle strength EXCEPT early fatigue with heel raise more so on the right compared to the left    Sensation: SILT in lower extremities bilaterally L3-S1 except right L5/S1 distribution from the leg distally    DTRs: bilateral lower extremity stretch reflexes are equal and symmetric patellar reflex 1+, Achilles reflex trace  Clonus: none    Gait: reveals mildly antalgic gait    Musculoskeletal:  Lumbar Spine: Mildly " reduced  ROM  pain with end-range,  non tender to palpation, posterior pelvic tilt, facet loading with pain bilaterally, Seated Str Leg Raise negative, hip provocative maneuvers negative.           ASSESSMENT:  Reece Washington is a pleasant 58 year old male who presents with new onset low back pain after pulling/lifting semitruck parts hit work (8/10/2023) resulting in:    #.  Acute onset lumbosacral pain and radiculopathy -worse on the right compared to the left.  On review of history, physical examination, and imaging his symptoms appear to be consistent with S1 radiculopathy, notably on the right.    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  He is currently off until 10/8/2023.    PLAN:  -Imaging reviewed in detail with patient today  -Medrol Dosepak  -Start gabapentin 300 mg at bedtime up to 300mg times daily as instructed  -Continue current physical therapy  -He will plan to discuss with his insurance possible QRC to help facilitate return to work.  Ultimately, he may need an occupational medicine referral.  However, I am optimistic that with a tincture of time, continue physical therapy and medication management +/- consideration of AUTUMN he will respond well to conservative management.  If not, we could consider another surgical spine referral.  At this time, he defers injection therapies.  - RTC x1 week    Ready to learn, no apparent learning barriers.  Education provided on treatment plan according to patient's preferred learning style.  Patient verbalizes understanding.   __________________________________  Ryan Amin MD  Physical Medicine & Rehabilitation        I spent a total of 53 minutes on the day of the visit.   Time spent by me doing chart review, history and exam, documentation and further activities per the note

## 2023-09-28 NOTE — TELEPHONE ENCOUNTER
Patient needs appointment for further refills.     Blood pressure has not been in goal.     Gregory Mullins PA-C

## 2023-09-29 ENCOUNTER — OFFICE VISIT (OUTPATIENT)
Dept: NEUROSURGERY | Facility: CLINIC | Age: 58
End: 2023-09-29
Payer: OTHER MISCELLANEOUS

## 2023-09-29 ENCOUNTER — PRE VISIT (OUTPATIENT)
Dept: NEUROSURGERY | Facility: CLINIC | Age: 58
End: 2023-09-29

## 2023-09-29 VITALS
WEIGHT: 225 LBS | SYSTOLIC BLOOD PRESSURE: 132 MMHG | DIASTOLIC BLOOD PRESSURE: 81 MMHG | BODY MASS INDEX: 34.1 KG/M2 | HEART RATE: 87 BPM | HEIGHT: 68 IN

## 2023-09-29 DIAGNOSIS — M54.42 ACUTE BILATERAL LOW BACK PAIN WITH BILATERAL SCIATICA: ICD-10-CM

## 2023-09-29 DIAGNOSIS — M54.41 ACUTE BILATERAL LOW BACK PAIN WITH BILATERAL SCIATICA: ICD-10-CM

## 2023-09-29 DIAGNOSIS — M54.16 LUMBAR RADICULOPATHY: Primary | ICD-10-CM

## 2023-09-29 PROCEDURE — 99204 OFFICE O/P NEW MOD 45 MIN: CPT | Performed by: PHYSICAL MEDICINE & REHABILITATION

## 2023-09-29 RX ORDER — METHYLPREDNISOLONE 4 MG
TABLET, DOSE PACK ORAL
Qty: 21 TABLET | Refills: 0 | Status: SHIPPED | OUTPATIENT
Start: 2023-09-29 | End: 2023-10-27

## 2023-09-29 RX ORDER — GABAPENTIN 300 MG/1
300 CAPSULE ORAL 3 TIMES DAILY
Qty: 90 CAPSULE | Refills: 3 | Status: SHIPPED | OUTPATIENT
Start: 2023-09-29
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
## 2023-09-29 NOTE — NURSING NOTE
"Reece Washington's goals for this visit include: ,rfv    He requests these members of his care team be copied on today's visit information: yes    PCP: Sreedhar Rich    Referring Provider:  No referring provider defined for this encounter.    /81   Pulse 87   Ht 1.727 m (5' 8\")   Wt 102.1 kg (225 lb)   BMI 34.21 kg/m      Do you need any medication refills at today's visit? No  LICHA Montes, CMA ([facility])                                        "

## 2023-09-29 NOTE — PATIENT INSTRUCTIONS
It was a pleasure seeing you today in our PM&R Spine Health Clinic. We discussed the following:    #. Continue physical therapy      #. Discuss with your insurance regarding a QRC.   A qualified rehabilitation consultant (QRC) provides rehabilitation services if you need help returning to work due to your injury. The insurance company choose a QRC for you, but you can inquire if you have the right to choose your own QRC.    #. Medrol dosepak - is a steroid pack /burst that you can take for a few days to help with pain and inflammation    #. Gabapentin (Neurontin)           AM        PM       BEDTIME    Day 0-5         -            -             300mg  Day 5-10      300       -              300mg  Day 10+       300       300         300mg    Continue to increase your dose as discussed above if you are not experiencing any side effects (in other words - if you experience side effects do not progress to the next week). If you are experiencing any side effects, return to the previous dose that was tolerable and continue until follow-up.     The most common side effect is sedation. Do not drive a motor vehicle until after you are familiar with how the medication may impact your attention and reaction.    Note that it may take several weeks to notice the benefits of this medication.   Do not abruptly stopped this medication prior to consulting with a physician.

## 2023-10-06 ENCOUNTER — VIRTUAL VISIT (OUTPATIENT)
Dept: NEUROSURGERY | Facility: CLINIC | Age: 58
End: 2023-10-06
Payer: OTHER MISCELLANEOUS

## 2023-10-06 DIAGNOSIS — M54.41 ACUTE BILATERAL LOW BACK PAIN WITH BILATERAL SCIATICA: ICD-10-CM

## 2023-10-06 DIAGNOSIS — M54.42 ACUTE BILATERAL LOW BACK PAIN WITH BILATERAL SCIATICA: ICD-10-CM

## 2023-10-06 DIAGNOSIS — M54.16 LUMBAR RADICULOPATHY: Primary | ICD-10-CM

## 2023-10-06 PROCEDURE — 99214 OFFICE O/P EST MOD 30 MIN: CPT | Mod: 95 | Performed by: PHYSICAL MEDICINE & REHABILITATION
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
## 2023-10-06 NOTE — PROGRESS NOTES
Reece is a 58 year old who is being evaluated via a billable telephone visit.      What phone number would you like to be contacted at? [phone]   How would you like to obtain your AVS? Lewis    Distant Location (provider location):  On-site  Phone call duration: 20 minutes

## 2023-10-06 NOTE — LETTER
October 6, 2023      Reece Washington  [address] [facility] [address]        To Whom It May Concern:    Reece Washington  was seen on 10/6/2023 for follow up in our PM&R Spine Health Clinic as a result of a work-related injury. Given his ongoing symptoms, physical demand for required for him to complete his work duties and apparent lack of available alternatives. Please excuse him  until follow up scheduled Oct 27, 2023.        Sincerely,        Ryan Amin MD  Medical Director, Spine Health Program  Department of Physical Medicine & Rehabilitation

## 2023-10-06 NOTE — LETTER
"    10/6/2023         RE: Reece Washington  [address] [facility] [address]        Dear Colleague,    Thank you for referring your patient, Reece Washington, to the [facility]UROS[facility] [place]. Please see a copy of my visit note below.    Reece is a 58 year old who is being evaluated via a billable telephone visit.      What phone number would you like to be contacted at? [phone]   How would you like to obtain your AVS? MyChart    Distant Location (provider location):  On-site  Phone call duration: 20 minutes     PM&R Follow-Up Visit -     Date of Initial Visit: 09/29/23  LOV: 09/29/23  TD: 10/6/2023 (Phone visit)    Recall: Reece Washington is a 58 year old male acute onset lumbosacral pain and radiculopathy (S1) after pulling/lifting semitruck parts at his work (8/10/2023)    INTERVAL HISTORY:  Patient states that he is his last dose of steroid today.  Pain is improved overall and has been \"dulled \"by the steroids; however, he continues to have burning quality symptoms and numbness extending down to his right greater than left leg.    Takes gabapentin twice daily. States he spoke with his insurance and is anticipating working with a [facility] as he does feel somewhat at a loss on how to best manage his work-related injuries and return to work.     He is otherwise continuing PT and has an appointment scheduled later today.     RECALL HISTORY OF PRESENT ILLNESS:  Reece Washington is a 58 year old male who presents with a chief complaint of low back and lower extremity pain.     Pain began 8/10/2023 and is associated with a work-related injury.  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  He had been pulling/lifting items out of his van to a marshall at the end of the truck and felt a \"tearing feeling \"and thought that he had perhaps pulled something.  He has had persistent, ongoing pain despite attempting to return to work has not been able to do so and has been written off until " 10/8/2023.  He has continued with physical therapy at [facility] and seems to have 40 to 60% improvement in symptoms overall per their notes.  He seems to be able to lift up to 20 pounds with therapy.  He states that he did attempt to return to work with light duty or other types of restrictions but has had difficulty working with his employer on placement.  He feels somewhat at a loss on what to do.  He has been seen at urgent care, PCP, and our neurosurgery spine clinic.  MRI lumbar spine shows right L5-S1 central disc protrusion with associated focal mass defect on the descending S1 nerve root.  He was prescribed Flexeril and recommended Tylenol without significant proven his pain and symptoms.  Flexeril makes him quite tired.    He denies significant, chronic pain or spine issues.  He reports occasional intermittent back pain which she feels is age-appropriate with flareups occasionally that lasts for a few days and get better on their own.    He has been at his current work for only a month.  Since COVID he has had some difficulty finding more consistent work but prior to that had worked for [employer] for about 16 years.     Currently, he localizes the majority of his pain to his sacral and tailbone area with pain but mostly numbness/tingling radiating to the right much more than the left lower extremity in an L5/S1 distribution.  He denies bowel/bladder issues, saddle anesthesia or significant weakness.  He does state that his right knee feels unstable at times. Pain is described as constant burning and numb in nature. Pain is reported as 7/10 at rest today and up to 7/10 at worst in the last week. Symptoms are worse with generalized activities; and improved with relative rest, PT. He does report pain-related sleep disturbance.       PRIOR INJURIES/TREATMENT:   Ice/Heat: +  Physical Therapy:   Current PT at [facility]       - Current Pain Medications -   Tylenol prn   Medrol dosepak  Gabapentin  300mg BID    - Prior/Trialed Pain Medications -   Flexeril -makes him tired, does not help significantly with symptoms    Prior Procedures:  Date    Procedure   Improvement (%)  None              Prior Related Surgery: None   Other (acupuncture, OMT, CMM, TENS, DME, etc.): None    Specialists Seen - (with most recent, available notes and clinic visits reviewed)   1. Neurosurgery - Rell Nugent PA-C      IMAGING - reviewed   08/31/23 MRI Lumbar spine  IMPRESSION:  1. Multilevel degenerative changes, as described.  2. At L5-S1, there is a right central disc protrusion with associated focal mass effect upon and posterior displacement of the traversing right S1 nerve root. Please correlate clinically for possible right S1 radiculopathy.  3. No high-grade central spinal canal stenosis.  4. Mild/mild to moderate multilevel neural foraminal stenosis, as  described.  5. Partially visualized relatively large nonspecific cystic lesion of  the right kidney.       Medical History:  He  has a past medical history of Arthritis of right knee (7/27/2020), Back pain, Blunt eye trauma, Hypertension, Neck pain, and Pure hypercholesterolemia.     He  has a past surgical history that includes TOOTH EXTRACTION W/FORCEP; surgical history of -  (1981); orthopedic surgery; Colonoscopy (10/25/2013); Colonoscopy with CO2 insufflation (N/A, 10/4/2022); and Colonoscopy (N/A, 10/4/2022).    Family History  His family history includes Cancer in his maternal grandmother; Cerebrovascular Disease in his maternal grandfather; Heart Disease in his father; Hypertension in his father and mother; Lipids in his father.     Social History:  Work: Delivers semitruck parts  Current living situation: Lives in [place] with spouse  He  reports that he has never smoked. He has never used smokeless tobacco. He reports current alcohol use. He reports that he does not use drugs.        Current Medications:   He has a current medication list which includes the  following prescription(s): atorvastatin, cyclobenzaprine, fish oil-omega-3 fatty acids, gabapentin, losartan, losartan-hydrochlorothiazide, methylprednisolone, metoprolol succinate er, and escitalopram.       Allergies:   No Known Allergies    PHYSICAL EXAMINATION:  N/A due to Phone visit     ASSESSMENT:  Reece Washington is a pleasant 58 year old male who presents with new onset low back pain after pulling/lifting semitruck parts hit work (8/10/2023) resulting in:    #.  Acute onset lumbosacral pain and radiculopathy -worse on the right compared to the left.  On review of history, physical examination, and imaging his symptoms appear to be consistent with S1 radiculopathy, notably on the right.    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       We had a good, long talk regarding his options including continued medications and PT; adjuvant treatment with epidural steroid injections, or surgical referral. He continues to be apprehensive and declines lumbar AUTUMN and referral back to see a spine surgeon.     PLAN:  -Complete Medrol Dosepak (ends today)  -Continue gabapentin 300mg BID up to TID as instructed   -Continue current physical therapy  -Letter provided to remain off work x3 weeks. It would likely be helpful for him to have a QRC present at our next appointment to review ongoing care and help facilitate his work-related needs.     Ready to learn, no apparent learning barriers.  Education provided on treatment plan according to patient's preferred learning style.  Patient verbalizes understanding.   __________________________________  Ryan Amin MD  Physical Medicine & Rehabilitation       I spent a total of 30 minutes on the day of the visit.   Time spent by me doing chart review, history and exam, documentation and further activities per the note                                    Again, thank you for allowing me to participate in the care of your patient.        Sincerely,        Ryan Amin MD

## 2023-10-20 ENCOUNTER — TRANSFERRED RECORDS (OUTPATIENT)
Dept: HEALTH INFORMATION MANAGEMENT | Facility: CLINIC | Age: 58
End: 2023-10-20

## 2023-10-26 ENCOUNTER — DOCUMENTATION ONLY (OUTPATIENT)
Dept: LAB | Facility: CLINIC | Age: 58
End: 2023-10-26

## 2023-10-26 NOTE — PROGRESS NOTES
Reece Washington has an upcoming lab appointment:    Future Appointments   Date Time Provider Department Center   10/27/2023  8:00 AM Ryan Amin MD [facility] [place]   11/1/2023  8:45 AM AN [facility]         There is no order available. Please review and place either future orders or HMPO (Review of Health Maintenance Protocol Orders), as appropriate.    Health Maintenance Due   Topic    ANNUAL REVIEW OF HM ORDERS     HIV SCREENING     HEPATITIS C SCREENING      Macarena Giles

## 2023-10-26 NOTE — PROGRESS NOTES
PM&R Follow-Up Visit -     Date of Initial Visit: 09/29/23  LOV: 10/6/2023 (Phone visit)  TD: 10/27/2023     WC  Date of injury: 08/10/2023  Employer: [employer]  Job title: Parts runner/     QRC: Daniel Case Management-Promise Sargent MA, CDMS, [facility], C [[phone] ; [phone]]    Recall: Mr. Reece Washington is a 58 year old male acute onset lumbosacral pain and radiculopathy (S1) after pulling/lifting semitruck parts at his work (8/10/2023)    INTERVAL HISTORY:  Patient's last follow-up visit was October 6, 2023.  Today, he is accompanied by his QRC.    Since last time, he reports having an 'episode' of increased pain when getting out of his bed and getting day going - feeling acute electric shock pain - affecting the low back and both legs lasting about 4 days. He continued with therapy which did provide some relief and now he is  to his baseline with pain and numbness/tingling down the right leg.     He has continued with physical therapy. He continues to feel about 50% improved since onset of symptoms. He feels pain increases if he is not consistent with his HEP.     He continues gabapentin typically 300 mg twice daily but has not been consistent with taking the medication.  He tends to take it when his symptoms are worse or increase.    Through discussion with his patient and [facility], there does not appear to be a good alternative job functions that he may resume at this time and his prior responsibilities tended to involve physical demands that required lifting and/or moving up to 100 pounds.  Certainly, he does not feel that he could resume these duties at this time.      RECALL HISTORY OF PRESENT ILLNESS:  Reece Washington is a 58 year old male who presents with a chief complaint of low back and lower extremity pain.     Pain began 8/10/2023 and is associated with a work-related injury.  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  He had been pulling/lifting  "items out of his van to a marshall at the end of the truck and felt a \"tearing feeling \"and thought that he had perhaps pulled something.  He has had persistent, ongoing pain despite attempting to return to work has not been able to do so and has been written off until 10/8/2023.  He has continued with physical therapy at [facility] and seems to have 40 to 60% improvement in symptoms overall per their notes.  He seems to be able to lift up to 20 pounds with therapy.  He states that he did attempt to return to work with light duty or other types of restrictions but has had difficulty working with his employer on placement.  He feels somewhat at a loss on what to do.  He has been seen at urgent care, PCP, and our neurosurgery spine clinic.  MRI lumbar spine shows right L5-S1 central disc protrusion with associated focal mass defect on the descending S1 nerve root.  He was prescribed Flexeril and recommended Tylenol without significant proven his pain and symptoms.  Flexeril makes him quite tired.    He denies significant, chronic pain or spine issues.  He reports occasional intermittent back pain which she feels is age-appropriate with flareups occasionally that lasts for a few days and get better on their own.    He has been at his current work for only a month.  Since COVID he has had some difficulty finding more consistent work but prior to that had worked for [employer] for about 16 years.     Currently, he localizes the majority of his pain to his sacral and tailbone area with pain but mostly numbness/tingling radiating to the right much more than the left lower extremity in an L5/S1 distribution.  He denies bowel/bladder issues, saddle anesthesia or significant weakness.  He does state that his right knee feels unstable at times. Pain is described as constant burning and numb in nature. Pain is reported as 7/10 at rest today and up to 7/10 at worst in the last week. Symptoms are worse with generalized " activities; and improved with relative rest, PT. He does report pain-related sleep disturbance.       PRIOR INJURIES/TREATMENT:   Ice/Heat: +  Physical Therapy:   Current PT at [facility]       - Current Pain Medications -   Tylenol prn   Gabapentin 300mg BID    - Prior/Trialed Pain Medications -   Flexeril -makes him tired, does not help significantly with symptoms  Medrol dosepak    Prior Procedures:  Date    Procedure   Improvement (%)  None              Prior Related Surgery: None   Other (acupuncture, OMT, CMM, TENS, DME, etc.): None    Specialists Seen - (with most recent, available notes and clinic visits reviewed)   1. Neurosurgery - Rell Nugent PA-C      IMAGING - reviewed   08/31/23 MRI Lumbar spine  IMPRESSION:  1. Multilevel degenerative changes, as described.  2. At L5-S1, there is a right central disc protrusion with associated focal mass effect upon and posterior displacement of the traversing right S1 nerve root. Please correlate clinically for possible right S1 radiculopathy.  3. No high-grade central spinal canal stenosis.  4. Mild/mild to moderate multilevel neural foraminal stenosis, as described.  5. Partially visualized relatively large nonspecific cystic lesion of  the right kidney.       Medical History:  He  has a past medical history of Arthritis of right knee (7/27/2020), Back pain, Blunt eye trauma, Hypertension, Neck pain, and Pure hypercholesterolemia.    He has no past medical history of Amblyopia, Cataract, Diabetes mellitus (H), Diabetic retinopathy (H), Glaucoma (increased eye pressure), Retinal detachment, Senile macular degeneration, Strabismus, or Uveitis.     He  has a past surgical history that includes TOOTH EXTRACTION W/FORCEP; surgical history of -  (1981); orthopedic surgery; Colonoscopy (10/25/2013); Colonoscopy with CO2 insufflation (N/A, 10/4/2022); and Colonoscopy (N/A, 10/4/2022).     Family History  His family history includes Cancer in his maternal grandmother;  "Cerebrovascular Disease in his maternal grandfather; Heart Disease in his father; Hypertension in his father and mother; Lipids in his father.     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 He has never used smokeless tobacco. He reports current alcohol use. He reports that he does not use drugs.        Current Medications:   He has a current medication list which includes the following prescription(s): atorvastatin, cyclobenzaprine, diazepam, fish oil-omega-3 fatty acids, gabapentin, losartan, losartan-hydrochlorothiazide, metoprolol succinate er, and escitalopram.       Allergies:   No Known Allergies    PHYSICAL EXAMINATION:  BP (!) 178/108   Pulse 91   Ht 1.727 m (5' 8\")   Wt 102.1 kg (225 lb)   BMI 34.21 kg/m     General: Pleasant, straightforward, WDWN individual.  Mental Status: Pleasant, direct, appropriate mood and affect  Resp: breathing is unlabored without audible wheeze  Vascular: No visible cyanosis, no venous stasis changes  Heme: No visible ecchymosis or erythema on extremities  Skin: No notable rash    Neurologic:  Strength: All major muscle groups of the bilateral lower extremities have normal and symmetric muscle strength EXCEPT early fatigability with right heel raise compared to the left    Sensation: SILT in lower extremities bilaterally L3-S1 except right leg and foot/ankle decreased sensation in an L5/S1 distribution.  He does report some numbness in his left leg from prior surgery    DTRs: bilateral lower extremity stretch reflexes are equal and symmetric     Musculoskeletal:  +Seated SLR on the right     ASSESSMENT:  Reece Washington is a pleasant 58 year old male who presents with new onset low back pain after pulling/lifting semitruck parts hit work (8/10/2023) resulting in:    #.  Acute onset lumbosacral pain and radiculopathy -worse on the right compared to the left.  On " review of history, physical examination, and imaging his symptoms appear to be consistent with S1 radiculopathy, notably on the right.    PLAN:  -Continue gabapentin 300mg BID up to TID as instructed. Take consistently as tolerating.  Discussed up titration to 600 mg 3 times daily.  Defers currently  -Continue physical therapy  -Refer for fluoroscopically guided RIGHT L5-S1 and S1-2 transforaminal epidural steroid injection  -Again, reviewed imaging results. We discussed the prognosis and natural course.  -At this point, it does not appear that he has good work alternatives that would allow him to resume in a more limited capacity; therefore, I will continue to have her remain off work at this time. Should he have ongoing, prolonged work-related issues, we can have him work more closely with an occupational medicine specialist, if not his PCP.  -Ultimately, if he has ongoing and refractory symptoms to ongoing conservative management.  I would refer him back to our my  spine surgery colleagues  -Follow-up approximately 2 weeks after procedure.    Ready to learn, no apparent learning barriers.  Education provided on treatment plan according to patient's preferred learning style.  Patient verbalizes understanding.   __________________________________  Ryan Amin MD  Physical Medicine & Rehabilitation       I spent a total of 45 minutes on the day of the visit.   Time spent by me doing chart review, history and exam, documentation and further activities per the note

## 2023-10-27 ENCOUNTER — OFFICE VISIT (OUTPATIENT)
Dept: NEUROSURGERY | Facility: CLINIC | Age: 58
End: 2023-10-27
Payer: OTHER MISCELLANEOUS

## 2023-10-27 ENCOUNTER — TELEPHONE (OUTPATIENT)
Dept: PHYSICAL MEDICINE AND REHAB | Facility: CLINIC | Age: 58
End: 2023-10-27

## 2023-10-27 VITALS
WEIGHT: 225 LBS | DIASTOLIC BLOOD PRESSURE: 108 MMHG | HEIGHT: 68 IN | SYSTOLIC BLOOD PRESSURE: 178 MMHG | HEART RATE: 91 BPM | BODY MASS INDEX: 34.1 KG/M2

## 2023-10-27 DIAGNOSIS — M54.16 LUMBAR RADICULOPATHY: Primary | ICD-10-CM

## 2023-10-27 PROCEDURE — 99215 OFFICE O/P EST HI 40 MIN: CPT | Performed by: PHYSICAL MEDICINE & REHABILITATION

## 2023-10-27 RX ORDER — DIAZEPAM 5 MG
5 TABLET ORAL EVERY 6 HOURS PRN
Qty: 2 TABLET | Refills: 0 | Status: SHIPPED | OUTPATIENT
Start: 2023-10-27 | End: 2024-02-23

## 2023-10-27 NOTE — LETTER
10/27/2023         RE: Reece Washington  [address] [facility] [address]        Dear Colleague,    Thank you for referring your patient, Reece Washington, to the [facility]. Please see a copy of my visit note below.    PM&R Follow-Up Visit -     Date of Initial Visit: 09/29/23  LOV: 10/6/2023 (Phone visit)  TD: 10/27/2023     WC  Date of injury: 08/10/2023  Employer: [employer]  Job title: Parts runner/     QRC: Daniel Case Management-Promise Sargent MA, CDMS, [facility], QRC [[phone] ; [phone]]    Recall: Mr. Reece Washington is a 58 year old male acute onset lumbosacral pain and radiculopathy (S1) after pulling/lifting semitruck parts at his work (8/10/2023)    INTERVAL HISTORY:  Patient's last follow-up visit was October 6, 2023.  Today, he is accompanied by his QRC.    Since last time, he reports having an 'episode' of increased pain when getting out of his bed and getting day going - feeling acute electric shock pain - affecting the low back and both legs lasting about 4 days. He continued with therapy which did provide some relief and now he is  to his baseline with pain and numbness/tingling down the right leg.     He has continued with physical therapy. He continues to feel about 50% improved since onset of symptoms. He feels pain increases if he is not consistent with his HEP.     He continues gabapentin typically 300 mg twice daily but has not been consistent with taking the medication.  He tends to take it when his symptoms are worse or increase.    Through discussion with his patient and QRC, there does not appear to be a good alternative job functions that he may resume at this time and his prior responsibilities tended to involve physical demands that required lifting and/or moving up to 100 pounds.  Certainly, he does not feel that he could resume these duties at this time.      RECALL HISTORY OF PRESENT ILLNESS:  Reece Washington  "is a 58 year old male who presents with a chief complaint of low back and lower extremity pain.     Pain began 8/10/2023 and is associated with a work-related injury.  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  He had been pulling/lifting items out of his van to a marshall at the end of the truck and felt a \"tearing feeling \"and thought that he had perhaps pulled something.  He has had persistent, ongoing pain despite attempting to return to work has not been able to do so and has been written off until 10/8/2023.  He has continued with physical therapy at [facility] and seems to have 40 to 60% improvement in symptoms overall per their notes.  He seems to be able to lift up to 20 pounds with therapy.  He states that he did attempt to return to work with light duty or other types of restrictions but has had difficulty working with his employer on placement.  He feels somewhat at a loss on what to do.  He has been seen at urgent care, PCP, and our neurosurgery spine clinic.  MRI lumbar spine shows right L5-S1 central disc protrusion with associated focal mass defect on the descending S1 nerve root.  He was prescribed Flexeril and recommended Tylenol without significant proven his pain and symptoms.  Flexeril makes him quite tired.    He denies significant, chronic pain or spine issues.  He reports occasional intermittent back pain which she feels is age-appropriate with flareups occasionally that lasts for a few days and get better on their own.    He has been at his current work for only a month.  Since COVID he has had some difficulty finding more consistent work but prior to that had worked for [employer] for about 16 years.     Currently, he localizes the majority of his pain to his sacral and tailbone area with pain but mostly numbness/tingling radiating to the right much more than the left lower extremity in an L5/S1 distribution.  He denies bowel/bladder issues, saddle anesthesia or " significant weakness.  He does state that his right knee feels unstable at times. Pain is described as constant burning and numb in nature. Pain is reported as 7/10 at rest today and up to 7/10 at worst in the last week. Symptoms are worse with generalized activities; and improved with relative rest, PT. He does report pain-related sleep disturbance.       PRIOR INJURIES/TREATMENT:   Ice/Heat: +  Physical Therapy:   Current PT at [facility]       - Current Pain Medications -   Tylenol prn   Gabapentin 300mg BID    - Prior/Trialed Pain Medications -   Flexeril -makes him tired, does not help significantly with symptoms  Medrol dosepak    Prior Procedures:  Date    Procedure   Improvement (%)  None              Prior Related Surgery: None   Other (acupuncture, OMT, CMM, TENS, DME, etc.): None    Specialists Seen - (with most recent, available notes and clinic visits reviewed)   1. Neurosurgery - Rell Nugent PA-C      IMAGING - reviewed   08/31/23 MRI Lumbar spine  IMPRESSION:  1. Multilevel degenerative changes, as described.  2. At L5-S1, there is a right central disc protrusion with associated focal mass effect upon and posterior displacement of the traversing right S1 nerve root. Please correlate clinically for possible right S1 radiculopathy.  3. No high-grade central spinal canal stenosis.  4. Mild/mild to moderate multilevel neural foraminal stenosis, as described.  5. Partially visualized relatively large nonspecific cystic lesion of  the right kidney.       Medical History:  He  has a past medical history of Arthritis of right knee (7/27/2020), Back pain, Blunt eye trauma, Hypertension, Neck pain, and Pure hypercholesterolemia.    He has no past medical history of Amblyopia, Cataract, Diabetes mellitus (H), Diabetic retinopathy (H), Glaucoma (increased eye pressure), Retinal detachment, Senile macular degeneration, Strabismus, or Uveitis.     He  has a past surgical history that includes TOOTH EXTRACTION  "W/FORCEP; surgical history of -  (1981); orthopedic surgery; Colonoscopy (10/25/2013); Colonoscopy with CO2 insufflation (N/A, 10/4/2022); and Colonoscopy (N/A, 10/4/2022).     Family History  His family history includes Cancer in his maternal grandmother; Cerebrovascular Disease in his maternal grandfather; Heart Disease in his father; Hypertension in his father and mother; Lipids in his father.     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 He has never used smokeless tobacco. He reports current alcohol use. He reports that he does not use drugs.        Current Medications:   He has a current medication list which includes the following prescription(s): atorvastatin, cyclobenzaprine, diazepam, fish oil-omega-3 fatty acids, gabapentin, losartan, losartan-hydrochlorothiazide, metoprolol succinate er, and escitalopram.       Allergies:   No Known Allergies    PHYSICAL EXAMINATION:  BP (!) 178/108   Pulse 91   Ht 1.727 m (5' 8\")   Wt 102.1 kg (225 lb)   BMI 34.21 kg/m     General: Pleasant, straightforward, WDWN individual.  Mental Status: Pleasant, direct, appropriate mood and affect  Resp: breathing is unlabored without audible wheeze  Vascular: No visible cyanosis, no venous stasis changes  Heme: No visible ecchymosis or erythema on extremities  Skin: No notable rash    Neurologic:  Strength: All major muscle groups of the bilateral lower extremities have normal and symmetric muscle strength EXCEPT early fatigability with right heel raise compared to the left    Sensation: SILT in lower extremities bilaterally L3-S1 except right leg and foot/ankle decreased sensation in an L5/S1 distribution.  He does report some numbness in his left leg from prior surgery    DTRs: bilateral lower extremity stretch reflexes are equal and symmetric     Musculoskeletal:  +Seated SLR on the right "     ASSESSMENT:  Reece Washington is a pleasant 58 year old male who presents with new onset low back pain after pulling/lifting semitruck parts hit work (8/10/2023) resulting in:    #.  Acute onset lumbosacral pain and radiculopathy -worse on the right compared to the left.  On review of history, physical examination, and imaging his symptoms appear to be consistent with S1 radiculopathy, notably on the right.    PLAN:  -Continue gabapentin 300mg BID up to TID as instructed. Take consistently as tolerating.  Discussed up titration to 600 mg 3 times daily.  Defers currently  -Continue physical therapy  -Refer for fluoroscopically guided RIGHT L5-S1 and S1-2 transforaminal epidural steroid injection  -Again, reviewed imaging results. We discussed the prognosis and natural course.  -At this point, it does not appear that he has good work alternatives that would allow him to resume in a more limited capacity; therefore, I will continue to have her remain off work at this time. Should he have ongoing, prolonged work-related issues, we can have him work more closely with an occupational medicine specialist, if not his PCP.  -Ultimately, if he has ongoing and refractory symptoms to ongoing conservative management.  I would refer him back to our my  spine surgery colleagues  -Follow-up approximately 2 weeks after procedure.    Ready to learn, no apparent learning barriers.  Education provided on treatment plan according to patient's preferred learning style.  Patient verbalizes understanding.   __________________________________  Ryan Amin MD  Physical Medicine & Rehabilitation       I spent a total of 45 minutes on the day of the visit.   Time spent by me doing chart review, history and exam, documentation and further activities per the note                                   Again, thank you for allowing me to participate in the care of your patient.        Sincerely,        Ryan Amin MD

## 2023-10-27 NOTE — NURSING NOTE
"Reece Washington's goals for this visit include:   Chief Complaint   Patient presents with    RECHECK      follow up in 1 month, 10/26  patient         He requests these members of his care team be copied on today's visit information: yes    PCP: Sreedhar Rich    Referring Provider:  No referring provider defined for this encounter.    BP (!) 178/108   Pulse 91   Ht 1.727 m (5' 8\")   Wt 102.1 kg (225 lb)   BMI 34.21 kg/m      Do you need any medication refills at today's visit? No  LICHA Montes, CMA ([facility])      "

## 2023-10-27 NOTE — PATIENT INSTRUCTIONS
It was a pleasure seeing you today in our [facility]. We discussed the following:    #. Continue to remain off work until     #. Continue physical therapy     #. Gabapentin 300mg three times daily    #. Schedule Right L5-S1 and S1-2 transforaminal epidural steroid injection    Preparing for Spine Injection Therapy              Why Do I Need Spine Injection Therapy?  Your Health Care Provider is recommending spine injection therapy to help relieve your back and neck pain. This will be in addition to other therapies such as medications and physical therapy. The purpose of these injections is to reduce the amount of inflammation (swelling, pain, heat, redness, loss of body function) around the nerves thus reducing the amount of pain.     The medications you will receive with the injections will include:   Anesthetic - medication to numb the painful area.    Steroid - medication that prevents or reduces swelling and pain (anti-inflammatory).   To reduce your discomfort during the injection procedure, you will receive a numbing medication injection prior to the placement of the needles. You will be lying on your stomach during the injection procedure. We will use a low-dose x-ray (fluoroscopy) to help guide needle placement.  You must have a  for this procedure. We will need to reschedule your injection if you do not have a  with you.      What are the different types of injections and procedure?  Below, is a brief description of the different types of injections we use to deliver pain medication as close as possible to the nerves in the painful area:    Epidural injection: (picture on the right) Epidural injections place 2 medications in your epidural space.  This is the space alongside your spinal canal (not inside of it). Nerves from your spinal cord pass through this space. The medications will bathe those nerves.           What Should I Expect if I Receive Sedation? THIS IS ORDERED BY THE  PHYSICIAN  This is conscious sedation.  The medications we give to you will help you relax and reduce your anxiety.  You will still be awake for the procedure so we can ask you questions and hear your answers.     What Are My Responsibilities With Sedation?  You must stop eating and drinking 8 (eight) hours before your procedure. You can have clear fluids (water, coffee/tea without milk) up to 2 hours prior to the injections. Nothing by mouth for 2 hours prior to injection.  This includes gum, mints, or chew.  Take your morning medications with a small sip of water.    You must have a  who will check-in with you, and stay in the building while the procedure is underway.  If you do not have a  your sedation will be cancelled.  We will monitor you for at least 30 minutes after the procedure before being discharged home.      What Are the Risks and Complications For This Procedure?  Risks and complication are rare, but can still occur.  You should understand, discuss, and accept these risks before agreeing to the procedure. They include, but are not limited to:  infection  nerve damage   paralysis  injection failure or a need for further injections or additional procedures  continued or worsening of symptoms/pain,   medication reaction,  dural leak (into the hole covering around the spinal cord. This may cause a spinal headache)  leak of the medication into the spinal canal, nerves, or blood vessel.  death       What do I need to do before the procedure?   If you do not follow these instructions your procedure may be cancelled.  Tell us if you are on major blood thinners such as Coumadin, Xarelto , Plavix, Eliquis , Pradaxa , or others.    Contact the doctor who prescribed your blood thinner to ask for permission to stop taking it before you have the injection.    Schedule your pain injection procedure after your doctor gave their permission.   We will notify you when to stop and re-start your blood  thinner.  Tell us if you have any allergies to contrast dye.  If you do, we may give additional medications to take before the procedure.  Tell us if you are pregnant, or possibly pregnant.  If so, you cannot receive steroid medications or be exposed to fluoroscopic X-rays.  Tell us if you have been sick during the 10 days before the procedure. This includes:   colds   gastrointestinal illness or discomfort  dental sores,   skin infection, or any other type of infection.  Tell us if you have taken antibiotics during the 10 days prior to the procedure.  Do not drink alcohol the night before or on the day of the procedure.  You must shower the night before and on the day of your procedure.  Wear comfortable, clean clothing.  If you have an outside MRI (Magnetic Resonance Imaging photo), please bring it with you.    What Will Happen After the Procedure?  If you did not receive sedation we will monitor you for 15 minutes after the procedure. If you received sedation, we will monitor you for at least 30 minutes after the procedure     How soon can I expect pain relief?  You have received 2 types of medications with your injection:    Anesthetic - numbing medication which only acts for a few hours    Steroid which may take 3-14 days to be effective.   You can expect to feel your normal pain after the anesthetic wears off, until the steroid becomes effective.    How should I care for myself at home?  Get plenty of rest and avoid twisting, bending movements, heavy lifting, or strenuous activity for the first 24 hours. This will help the steroid be more effective. Medial Branch Block injections will have different discharge instructions.  Those will be discussed at that injection appointment.  Resume your pain medications  Apply ice packs (on for 20 minutes at a time), every 2-3 hours to your injection area for the first 2-3 days to help with pain control.    Avoid heat (pads or water bottles), which can cause the veins to  open up, making the steroid less effective. You can use heat after 48 hours.  Take showers only for the first 48 hours.  No baths, hot tubs, swimming, or soaking for 48 hours to reduce the risk of infection.     When should I call the doctor?  Call us if you have any of the following:   Fever more than 100.5 degrees Fahrenheit   Signs of infection   Severe headache   Severe back pain   Increased numbness or weakness in your legs or arms   Loss of bladder or bowel control  Nausea   Other concerns    What is the contact information?  During business hours Monday-Friday 8a-5p call [phone].   After business hours, on weekends and holidays call [phone] and ask for the PM&R doctor on call

## 2023-10-27 NOTE — LETTER
October 27, 2023      RE: Reece Washington  [address] [facility] [address]        To Whom It May Concern,       Please excuse Reece from his work duties until his next follow-up visit at 2 weeks post-injection.   Thank you.       Sincerely,      Electronically signed  Ryan Amin MD

## 2023-10-30 ENCOUNTER — DOCUMENTATION ONLY (OUTPATIENT)
Dept: NEUROSURGERY | Facility: CLINIC | Age: 58
End: 2023-10-30

## 2023-10-30 NOTE — PROGRESS NOTES
Can [place],    Patient has rescheduled his lab appointment for 11.05.23. Please have your team reach out to Reece to inform him of follow up care.  Thank you.  Xuan Wagner MLT at [facility]         Please review and place future lab orders for Reece's lab only appointment tomorrow 11.01.23.  If labs are not wanted, please have your team reach out to Reece to inform him.  Thank you.  Xuan Wagner MLT at [facility]   October 31, 2023                 Patient is requesting a PSA.  Thank you.  Xuan Wagner MLT at [facility]   October 30, 2023

## 2023-10-30 NOTE — PROGRESS NOTES
Faxed Physical therapy progress Form October 30, 2023 to fax number [phone] and HIM    Right Fax confirmed at 10:00  AM    Oumou Nunez

## 2023-10-31 ENCOUNTER — TELEPHONE (OUTPATIENT)
Dept: PHYSICAL MEDICINE AND REHAB | Facility: CLINIC | Age: 58
End: 2023-10-31

## 2023-10-31 NOTE — TELEPHONE ENCOUNTER
Patient is scheduled for surgery with Dr. Amin    Spoke with: Patient    Date of Surgery: 12/15/23    Location: [facility]    Informed patient they will need an adult  yes    Additional comments: Patient is aware of date and time of the procedure.        Aminata Henderson MA on 10/31/2023 at 8:56 AM

## 2023-11-01 ENCOUNTER — MYC MEDICAL ADVICE (OUTPATIENT)
Dept: FAMILY MEDICINE | Facility: CLINIC | Age: 58
End: 2023-11-01

## 2023-11-01 NOTE — TELEPHONE ENCOUNTER
Patient had canceled appointment with me as well as lab appointment prior to orders being placed.     Not due for PSA. Last June of 2023.     Needs to follow up with urology if ongoing symptoms.     Gregory Mullins PA-C

## 2023-11-01 NOTE — PROGRESS NOTES
Patient is now scheduled for a lab appointment on 11/5/23. Left message for patient to call back, and sent him a [employer] message.Ching Welch [facility]

## 2023-11-02 ENCOUNTER — MYC REFILL (OUTPATIENT)
Dept: FAMILY MEDICINE | Facility: CLINIC | Age: 58
End: 2023-11-02

## 2023-11-02 DIAGNOSIS — I10 HYPERTENSION GOAL BP (BLOOD PRESSURE) < 140/90: ICD-10-CM

## 2023-11-02 NOTE — PROGRESS NOTES
Patient responded to [employer] message. He knows that he does not need a lab appointment.Ching Welch [facility]

## 2023-11-03 RX ORDER — METOPROLOL SUCCINATE 50 MG/1
50 TABLET, EXTENDED RELEASE ORAL DAILY
Qty: 30 TABLET | Refills: 0 | Status: SHIPPED | OUTPATIENT
Start: 2023-11-03 | End: 2023-12-29

## 2023-11-03 RX ORDER — LOSARTAN POTASSIUM AND HYDROCHLOROTHIAZIDE 12.5; 1 MG/1; MG/1
1 TABLET ORAL DAILY
Qty: 30 TABLET | Refills: 0 | Status: SHIPPED | OUTPATIENT
Start: 2023-11-03 | End: 2023-12-29

## 2023-11-03 NOTE — TELEPHONE ENCOUNTER
Sent patient a Mychart message, that an appointment is needed for further refills.Ching [facility]

## 2023-11-06 ENCOUNTER — TRANSFERRED RECORDS (OUTPATIENT)
Dept: HEALTH INFORMATION MANAGEMENT | Facility: CLINIC | Age: 58
End: 2023-11-06

## 2023-11-08 ENCOUNTER — TRANSFERRED RECORDS (OUTPATIENT)
Dept: HEALTH INFORMATION MANAGEMENT | Facility: CLINIC | Age: 58
End: 2023-11-08

## 2023-11-08 ENCOUNTER — TELEPHONE (OUTPATIENT)
Dept: NEUROSURGERY | Facility: CLINIC | Age: 58
End: 2023-11-08

## 2023-11-08 NOTE — TELEPHONE ENCOUNTER
[facility] Call Center    Phone Message    May a detailed message be left on voicemail: yes     Reason for Call: Promies, the QRC for this patient, indicates that worker's comp has not received the request for the upcoming injection with Dr. Amin nor the office notes. The request these be sent to:     Baylee Luong  ( with [employer] Worker's Comp)   Fax: [phone]  Phone: [phone]   Claim #: [id number]     The QRC for the patient, Promise can be reached at: [phone]     Action Taken: [facility]    Travel Screening: Not Applicable

## 2023-11-16 NOTE — TELEPHONE ENCOUNTER
[facility] Call Center    Phone Message    May a detailed message be left on voicemail: yes     Reason for Call: Promise, the QRC for this patient, called again and indicated that worker's comp has not received the request for the upcoming injection with Dr. Amin nor the office notes.     Please review and call Promise back with any questions and to update [phone].     Action Taken: Message routed to:  Adult Clinics: Neurology p [id number]    Travel Screening: Not Applicable

## 2023-11-16 NOTE — TELEPHONE ENCOUNTER
Called and spoke with patient's C Promise. Explained that writer had spoken with our prior auth team. They stated as the injection was so far out (Dec 29th) they had not started work on it, but will as it gets closer. C stated understanding. She asked if there is a different location where the patient could be seen sooner. Writer let her know that the patient could also be seen at [place] or [place]. She stated understanding, and would let the patient know about that option. She also scheduled patient's follow up for after the injection. No further questions at this time.

## 2023-11-17 ENCOUNTER — DOCUMENTATION ONLY (OUTPATIENT)
Dept: NEUROSURGERY | Facility: CLINIC | Age: 58
End: 2023-11-17

## 2023-11-17 NOTE — PROGRESS NOTES
Faxed PT progress Update Form November 17, 2023 to fax number [phone]    Right Fax confirmed at 3:15 PM    Oumou Nunez

## 2023-11-17 NOTE — PROGRESS NOTES
Faxed Physical therapy progress note Form November 17, 2023 to fax number [phone] and HIM    Right Fax confirmed at 8:33 AM    Oumou Nunez

## 2023-11-17 NOTE — PROGRESS NOTES
Faxed PT Progress Update Form November 17, 2023 to fax number [phone] and HIM.     Right Fax confirmed at 2:55 PM    Oumou Nunez

## 2023-11-21 ENCOUNTER — DOCUMENTATION ONLY (OUTPATIENT)
Dept: NEUROSURGERY | Facility: CLINIC | Age: 58
End: 2023-11-21

## 2023-11-28 ENCOUNTER — TRANSFERRED RECORDS (OUTPATIENT)
Dept: HEALTH INFORMATION MANAGEMENT | Facility: CLINIC | Age: 58
End: 2023-11-28

## 2023-11-29 ENCOUNTER — MEDICAL CORRESPONDENCE (OUTPATIENT)
Dept: HEALTH INFORMATION MANAGEMENT | Facility: CLINIC | Age: 58
End: 2023-11-29

## 2023-12-04 DIAGNOSIS — I10 HYPERTENSION GOAL BP (BLOOD PRESSURE) < 140/90: ICD-10-CM

## 2023-12-05 NOTE — TELEPHONE ENCOUNTER
Please contact patient.     Needs appointment for any further refills.     No refills sent at this time.     Gregory Mullins PA-C

## 2023-12-06 RX ORDER — METOPROLOL SUCCINATE 50 MG/1
50 TABLET, EXTENDED RELEASE ORAL DAILY
Qty: 30 TABLET | Refills: 0 | OUTPATIENT
Start: 2023-12-06

## 2023-12-06 RX ORDER — LOSARTAN POTASSIUM AND HYDROCHLOROTHIAZIDE 12.5; 1 MG/1; MG/1
1 TABLET ORAL DAILY
Qty: 30 TABLET | Refills: 0 | OUTPATIENT
Start: 2023-12-06

## 2023-12-06 NOTE — TELEPHONE ENCOUNTER
Called and spoke to patient. He said that he does not have insurance right now, and cannot set up an appointment. He is waiting for [place] [employer] to get back to him, he said they are backed up right now. Please advise if you can refill or not.Ching Welch [facility]

## 2023-12-07 NOTE — TELEPHONE ENCOUNTER
December 6, 2023  Gregory Mullins PA-C   to [facility]         12/6/23 12:58 PM  Patient needs appointment for any further refills.    Gregory Mullins PA-C    Left message on patient's voicemail, that [facility] did say again, that the patient does need an appointment for refills.    Ching Welch [facility]

## 2023-12-11 ENCOUNTER — TELEPHONE (OUTPATIENT)
Dept: FAMILY MEDICINE | Facility: CLINIC | Age: 58
End: 2023-12-11

## 2023-12-11 NOTE — TELEPHONE ENCOUNTER
Patient Quality Outreach    Patient is due for the following:   Colon Cancer Screening  Physical Preventive Adult Physical      Topic Date Due    COVID-19 Vaccine (1) Never done    Zoster (Shingles) Vaccine (1 of 2) Never done    Flu Vaccine (1) 09/01/2023       Next Steps:   Schedule a Adult Preventative    Type of outreach:    Sent [employer] message.      Questions for provider review:               Hector Hughes MA

## 2023-12-12 NOTE — PROGRESS NOTES
Faxed  PT POC  December 12, 2023 to fax number [phone] and HIM    Right Fax confirmed at 2:59 PM    Oumou Nunez

## 2023-12-15 ENCOUNTER — DOCUMENTATION ONLY (OUTPATIENT)
Dept: NEUROSURGERY | Facility: CLINIC | Age: 58
End: 2023-12-15

## 2023-12-15 NOTE — PROGRESS NOTES
Faxed  PT progress note  December 15, 2023 to fax number [phone]    Right Fax confirmed at 9:45 AM    Oumou Nunez

## 2023-12-29 ENCOUNTER — HOSPITAL ENCOUNTER (OUTPATIENT)
Facility: AMBULATORY SURGERY CENTER | Age: 58
Discharge: HOME | End: 2023-12-29
Attending: PHYSICAL MEDICINE & REHABILITATION | Admitting: PHYSICAL MEDICINE & REHABILITATION
Payer: OTHER MISCELLANEOUS

## 2023-12-29 ENCOUNTER — ANCILLARY PROCEDURE (OUTPATIENT)
Dept: GENERAL RADIOLOGY | Facility: CLINIC | Age: 58
End: 2023-12-29
Attending: PHYSICAL MEDICINE & REHABILITATION
Payer: OTHER MISCELLANEOUS

## 2023-12-29 VITALS
SYSTOLIC BLOOD PRESSURE: 154 MMHG | RESPIRATION RATE: 16 BRPM | TEMPERATURE: 98.2 F | WEIGHT: 225 LBS | OXYGEN SATURATION: 98 % | BODY MASS INDEX: 34.21 KG/M2 | HEART RATE: 82 BPM | DIASTOLIC BLOOD PRESSURE: 86 MMHG

## 2023-12-29 DIAGNOSIS — R52 PAIN: ICD-10-CM

## 2023-12-29 PROCEDURE — G8918 PT W/O PREOP ORDER IV AB PRO: HCPCS

## 2023-12-29 PROCEDURE — 64483 NJX AA&/STRD TFRM EPI L/S 1: CPT | Mod: RT

## 2023-12-29 PROCEDURE — G8907 PT DOC NO EVENTS ON DISCHARG: HCPCS

## 2023-12-29 PROCEDURE — 64484 NJX AA&/STRD TFRM EPI L/S EA: CPT | Mod: RT

## 2023-12-29 RX ORDER — DEXAMETHASONE SODIUM PHOSPHATE 10 MG/ML
INJECTION INTRAMUSCULAR; INTRAVENOUS PRN
Status: DISCONTINUED | OUTPATIENT
Start: 2023-12-29 | End: 2023-12-29 | Stop reason: HOSPADM

## 2023-12-29 RX ORDER — IOPAMIDOL 408 MG/ML
INJECTION, SOLUTION INTRATHECAL PRN
Status: DISCONTINUED | OUTPATIENT
Start: 2023-12-29 | End: 2023-12-29 | Stop reason: HOSPADM

## 2023-12-29 RX ORDER — LYSINE HCL 500 MG
500 TABLET ORAL DAILY
COMMUNITY

## 2023-12-29 RX ORDER — LIDOCAINE HYDROCHLORIDE 10 MG/ML
INJECTION, SOLUTION EPIDURAL; INFILTRATION; INTRACAUDAL; PERINEURAL PRN
Status: DISCONTINUED | OUTPATIENT
Start: 2023-12-29 | End: 2023-12-29 | Stop reason: HOSPADM

## 2023-12-29 NOTE — DISCHARGE INSTRUCTIONS
PAIN INJECTION HOME CARE INSTRUCTIONS  Activity  -Rest today  -Do not work today  -Resume normal activity tomorrow  -DO NOT shower for 24 hours  -DO NOT remove bandaid for 24 hours    Pain  -You may experience soreness at the injection site for one or two days  -You may use an ice pack for 20 minutes every 2 hours for the first 24 hours  -You may use a heating pad after the first 24 hours  -You may use Tylenol (acetaminophen) every 4 hours or other pain medicines as directed by your physician    You may experience numbness radiating into your legs or arms (depending on the procedure location). This numbness may last several hours. Until sensation returns to normal; please use caution in walking, climbing stairs, and stepping out of your vehicle, etc.    DID YOU RECEIVE SEDATION TODAY?  Yes    Safety  Sedation medicine, if given, may remain active for many hours. It is important for the next 24 hours that you do not:  -Drive a car  -Operate machines or power tools  -Consume alcohol, including beer  -Sign any important papers or legal documents    DID YOU RECEIVE STEROIDS TODAY?  Yes    Common side effects of steroids:  Not everyone will experience corticosteroid side effects. If side effects are experienced, they will gradually subside in the 7-10 day period following an injection. Most common side effects include:  -Flushed face and/or chest  -Feeling of warmth, particularly in the face but could be an overall feeling of warmth  -Increased blood sugar in diabetic patients  -Menstrual irregularities my occur. If taking hormone-based birth control an alternate method of birth control is recommended  -Sleep disturbances and/or mood swings are possible  -Leg cramps    Please contact us if you have:  -Severe pain  -Fever more than 101.5 degrees Fahrenheit  -Signs of infection at the injection site (redness, swelling, or drainage)      FOR PM&R PATIENTS:  For patients seen by the PM and R service, please call  [phone]. (Monday through Friday 8a-5p.  After business hours and weekends call [phone] and ask for the PM and R doctor on call). If you need to fax a pain diary to PM&R the fax number is [phone]. If you are unable to fax, uploading to mention is encouraged, then send to provider. If you have procedure scheduling questions please call [phone] Option #2.

## 2023-12-29 NOTE — OP NOTE
PROCEDURE NOTE: Lumbar Transforaminal Epidural Steroid Injection Under Fluoroscopic Guidance    PROCEDURE DATE: 12/29/2023    PATIENT NAME: Reece Washington   YOB: 1965    ATTENDING PHYSICIAN:  Ryan Amin MD   RESIDENT/FELLOW PHYSICIAN: None    PREOPERATIVE DIAGNOSIS:   Lumbar radiculopathy  POSTOPERATIVE DIAGNOSIS: same    PROCEDURE PERFORMED: Right  Lumbar Transforaminal Epidural Steroid Injection at the L5-S1 and S1-2 level(s)    FLUOROSCOPY WAS USED.    INDICATIONS FOR PROCEDURE:   Reece Washington is a 58 year old male with a clinical picture consistent with the above-mentioned diagnosis, resulting in radicular pain to the Right  lower extremity.    PROCEDURE AND FINDINGS:    He was greeted in the pre-procedure holding area. The risk, benefits and alternatives to the procedure were again reviewed with the patient and written informed consent was placed in the chart. An IV line was not placed.  A 500 mL bag of NS was not connected to the patient. He was taken to the procedure room and positioned prone on the fluoroscopy table.  Routine monitors were applied including EKG leads (with sedation only), blood pressure cuff, and pulse oximetry. Prior to the procedure a time out was completed, verifying correct patient, procedure, site, positioning, and implants and/or special equipment.     An AP fluoroscopic  film was taken to identify the L5 pedicle and the S1 superior articulating process and the S1 dorsal foramen. The skin was prepped with chlorhexidine and draped in the usual sterile fashion. The skin and subcutaneous tissue overlying the aforementioned anatomic targets were anesthetized using a 27-gauge 1-1/4 inch needle with 1% preservative-free lidocaine for a total volume of 4mls.      Then a 22-gauge 3.5 inch Quincke spinal needle was advanced under fluoroscopic guidance using an oblique view just inferior to the pedicle of the L5 and S1 level(s) on the Right  side(s).  Then, utilizing AP  and lateral fluoroscopic views, we confirmed the position of the needle tip to be within the neural foramen. After negative aspiration, 1 mls of Isovue-M contrast was injected under AP view at each level and confirmed adequate spread along the nerve root and in the epidural space. There was no evidence of intravascular uptake or intrathecal spread on imaging.     At this point, after negative aspiration, a total  1.5 mL volume of treatment injectate, consisting of 0.75mL Dexamethasone (10mg/mL) (15mg total dose), and  0.75 mL of 1% Lidocaine, was injected easily, per level.  The needle was then flushed with 0.5 ml of local anesthetic and removed. The needle insertion site was dressed appropriately.     Reece Washington was taken to the recovery room where he was monitored for a brief period of time. He tolerated the procedure well and was discharged home in stable condition with post procedural instructions.     Before the procedure, he reported a pain score of 5/10.   After the procedure, he reported a pain score of 5/10.      Follow-up will be Clinic Visit      COMPLICATIONS:  None    Comment(s):  None

## 2024-01-04 ENCOUNTER — MEDICAL CORRESPONDENCE (OUTPATIENT)
Dept: HEALTH INFORMATION MANAGEMENT | Facility: CLINIC | Age: 59
End: 2024-01-04
Payer: COMMERCIAL

## 2024-01-05 NOTE — PROGRESS NOTES
Faxed Form January 5, 2024 to fax number [phone] to [facility]    Right Fax confirmed at 4:10 PM    Aquilino Reyes

## 2024-01-10 ENCOUNTER — TRANSFERRED RECORDS (OUTPATIENT)
Dept: HEALTH INFORMATION MANAGEMENT | Facility: CLINIC | Age: 59
End: 2024-01-10
Payer: COMMERCIAL

## 2024-01-11 NOTE — PROGRESS NOTES
PM&R Follow-Up Visit -     Date of Initial Visit: 09/29/23  LOV: 12/29/23 (Procedure visit)  TD: 1/12/2024      WC  Date of injury: 08/10/2023  Employer: [employer]  Job title: Parts runner/     QRC: Daniel Case Management-Promise Sargent MA, CDMS, [facility], [facility] [[phone] ; [phone]]    Recall: Mr. Reece Washington is a 58 year old male acute onset lumbosacral pain and radiculopathy (S1) after pulling/lifting semitruck parts at his work (8/10/2023)    INTERVAL HISTORY:  Patient is accompanied by a covering [facility] number today (RUBINA Germainbernicediego)  Patient was last seen December 29, 2023.  At that visit, he underwent fluoroscopically guided right L5-S1 and S1-2 transforaminal epidural steroid injection. He returns today for post-procedure follow up. Following the injection, he reports one day of good relief.  However, since the injection he states that he has had worsening pain bilaterally down both legs.  He states that the predominant paresthesias on the right have transition to pain affecting both lower limbs.  He also has worse pain in the lumbosacral region down towards his tailbone.  He has continued working with physical therapy despite feeling that he has had this significant setback.  He otherwise denies fever/chills/night sweats.  Denies any issues at the site of injection including redness, drainage, or soreness. He has difficulty with sitting in a car for even short periods. Due to his increase in pain he has been taking gabapentin 300mg 2-3 times per day; no significant adverse effects noted from the medication. Pain score 6/10 today.     Furthermore, he reports being let go from his prior job. He is unsure what he may do next for employment. States he has an RAQUEL upcoming in a few weeks.     RECALL HISTORY OF PRESENT ILLNESS:  Reece Washington is a 58 year old male who presents with a chief complaint of low back and lower extremity pain.     Pain began 8/10/2023 and is associated with a  "work-related injury.  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  He had been pulling/lifting items out of his van to a marshall at the end of the truck and felt a \"tearing feeling \"and thought that he had perhaps pulled something.  He has had persistent, ongoing pain despite attempting to return to work has not been able to do so and has been written off until 10/8/2023.  He has continued with physical therapy at [facility] and seems to have 40 to 60% improvement in symptoms overall per their notes.  He seems to be able to lift up to 20 pounds with therapy.  He states that he did attempt to return to work with light duty or other types of restrictions but has had difficulty working with his employer on placement.  He feels somewhat at a loss on what to do.  He has been seen at urgent care, PCP, and our neurosurgery spine clinic.  MRI lumbar spine shows right L5-S1 central disc protrusion with associated focal mass defect on the descending S1 nerve root.  He was prescribed Flexeril and recommended Tylenol without significant proven his pain and symptoms.  Flexeril makes him quite tired.    He denies significant, chronic pain or spine issues.  He reports occasional intermittent back pain which she feels is age-appropriate with flareups occasionally that lasts for a few days and get better on their own.    He has been at his current work for only a month.  Since COVID he has had some difficulty finding more consistent work but prior to that had worked for [employer] for about 16 years.     Currently, he localizes the majority of his pain to his sacral and tailbone area with pain but mostly numbness/tingling radiating to the right much more than the left lower extremity in an L5/S1 distribution.  He denies bowel/bladder issues, saddle anesthesia or significant weakness.  He does state that his right knee feels unstable at times. Pain is described as constant burning and numb in nature. Pain " is reported as 7/10 at rest today and up to 7/10 at worst in the last week. Symptoms are worse with generalized activities; and improved with relative rest, PT. He does report pain-related sleep disturbance.       PRIOR INJURIES/TREATMENT:   Ice/Heat: +  Physical Therapy:   Current PT at [facility]       - Current Pain Medications -   Tylenol prn   Gabapentin 300mg BID-TID    - Prior/Trialed Pain Medications -   Flexeril -makes him tired, does not help significantly with symptoms  Medrol dosepak    Prior Procedures:  Date    Procedure   Improvement (%)  12/29/23 R L5 and S1 TFESI Improvement for one day           Prior Related Surgery: None   Other (acupuncture, OMT, CMM, TENS, DME, etc.): None    Specialists Seen - (with most recent, available notes and clinic visits reviewed)   1. Neurosurgery - Rell Nugent PA-C      IMAGING - reviewed   08/31/23 MRI Lumbar spine  IMPRESSION:  1. Multilevel degenerative changes, as described.  2. At L5-S1, there is a right central disc protrusion with associated focal mass effect upon and posterior displacement of the traversing right S1 nerve root. Please correlate clinically for possible right S1 radiculopathy.  3. No high-grade central spinal canal stenosis.  4. Mild/mild to moderate multilevel neural foraminal stenosis, as described.  5. Partially visualized relatively large nonspecific cystic lesion of  the right kidney.       Medical History:  He  has a past medical history of Arthritis of right knee (7/27/2020), Back pain, Blunt eye trauma, Hypertension, Neck pain, and Pure hypercholesterolemia.    He has no past medical history of Amblyopia, Cataract, Diabetes mellitus (H), Diabetic retinopathy (H), Glaucoma (increased eye pressure), Retinal detachment, Senile macular degeneration, Strabismus, or Uveitis.     He  has a past surgical history that includes TOOTH EXTRACTION W/FORCEP; surgical history of -  (1981); orthopedic surgery; Colonoscopy (10/25/2013); Colonoscopy  "with CO2 insufflation (N/A, 10/4/2022); Colonoscopy (N/A, 10/4/2022); and Inject epidural transforaminal (Right, 12/29/2023).     Family History  His family history includes Cancer in his maternal grandmother; Cerebrovascular Disease in his maternal grandfather; Heart Disease in his father; Hypertension in his father and mother; Lipids in his father.     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 He has never used smokeless tobacco. He reports current alcohol use. He reports that he does not use drugs.        Current Medications:   He has a current medication list which includes the following prescription(s): cyclobenzaprine, diazepam, fish oil-omega-3 fatty acids, gabapentin, gabapentin, l-lysine hcl, losartan, multiple vitamins-minerals, atorvastatin, and escitalopram.       Allergies:   No Known Allergies    PHYSICAL EXAMINATION:  BP (!) 171/101   Pulse 108   Ht 1.753 m (5' 9\")   Wt 104.3 kg (230 lb)   BMI 33.97 kg/m     General: Pleasant, straightforward, WDWN individual.  Mental Status: Pleasant, direct, appropriate mood and affect  Resp: breathing is unlabored without audible wheeze  Vascular: No visible cyanosis, no venous stasis changes  Heme: No visible ecchymosis or erythema on extremities  Skin: No notable rash    Neurologic:  Strength: All major muscle groups of the bilateral lower extremities have normal and symmetric muscle strength EXCEPT right ankle dorsiflexion and left hip flexion with effort and pain limitations    Sensation subjectively decreased to light touch at the medial leg and lateral leg and foot    DTRs: bilateral lower extremity stretch reflexes are equal and symmetric     Musculoskeletal:  Lumbar spine non-tender. No redness, erythema, ecchymosis at the core or trunk.    ASSESSMENT:  Reece Washington is a pleasant 58 year old male who presents with new onset low back pain " after pulling/lifting semitruck parts hit work (8/10/2023) resulting in:    #.  Acute onset lumbosacral pain and radiculopathy -worse on the right compared to the left.    He has experienced incomplete/inadequate symptomatic relief from medication management, continued course of physical therapy, and interventional pain procedures (lumbar TFESI). In fact, he has had worsening pain and symptoms in the last 2-3 weeks.     PLAN:  -Fluoroscopy images reviewed with patient today  -Update MRI lumbar spine given worsening, progressive symptoms   -Increase gabapentin 300 mg BID-TID up to 600mg TID as instructed.   -Continue PT and HEP  -QRC inquired about workability and I would suggest an occupational medicine referral which she could obtain through our clinic or his primary care physician.  However, this may be premature given that I am referring him back to see a spine surgeon and he currently is uncertain about where he would like to work and what he may be required to do.    -Refer to spine surgery clinic  -Follow up as needed.     Ready to learn, no apparent learning barriers.  Education provided on treatment plan according to patient's preferred learning style.  Patient verbalizes understanding.   __________________________________  Ryan Amin MD  Physical Medicine & Rehabilitation       I spent a total of 35 minutes on the day of the visit.   Time spent by me doing chart review, history and exam, documentation and further activities per the note

## 2024-01-12 ENCOUNTER — OFFICE VISIT (OUTPATIENT)
Dept: NEUROSURGERY | Facility: CLINIC | Age: 59
End: 2024-01-12
Payer: OTHER MISCELLANEOUS

## 2024-01-12 VITALS
DIASTOLIC BLOOD PRESSURE: 101 MMHG | HEIGHT: 69 IN | WEIGHT: 230 LBS | SYSTOLIC BLOOD PRESSURE: 171 MMHG | HEART RATE: 108 BPM | BODY MASS INDEX: 34.07 KG/M2

## 2024-01-12 DIAGNOSIS — M54.16 LUMBAR RADICULOPATHY: Primary | ICD-10-CM

## 2024-01-12 DIAGNOSIS — M54.41 ACUTE BILATERAL LOW BACK PAIN WITH BILATERAL SCIATICA: ICD-10-CM

## 2024-01-12 DIAGNOSIS — M54.42 ACUTE BILATERAL LOW BACK PAIN WITH BILATERAL SCIATICA: ICD-10-CM

## 2024-01-12 PROCEDURE — 99214 OFFICE O/P EST MOD 30 MIN: CPT | Performed by: PHYSICAL MEDICINE & REHABILITATION

## 2024-01-12 RX ORDER — GABAPENTIN 600 MG/1
TABLET ORAL
Qty: 90 TABLET | Refills: 3 | Status: SHIPPED | OUTPATIENT
Start: 2024-01-12

## 2024-01-12 NOTE — PATIENT INSTRUCTIONS
It was a pleasure seeing you today in our [facility]. We discussed the following:    #. Gabapentin (Neurontin)           AM        PM       BEDTIME    Day 0-5        300       300      600mg  Day 5-10      600       300      600mg  Day 10+       600       600      600mg    Continue to increase your dose as discussed above if you are not experiencing any side effects (in other words - if you experience side effects do not progress to the next week). If you are experiencing any side effects, return to the previous dose that was tolerable and continue until follow-up.     The most common side effect is sedation. Do not drive a motor vehicle until after you are familiar with how the medication may impact your attention and reaction.    Note that it may take several weeks to notice the benefits of this medication.   Do not abruptly stopped this medication prior to consulting with a physician.     #. MRI    MRI Lumbar spine order was added today. You can schedule this at the check-out desk today or Radiology will call you to schedule. You may also call [facility] Scheduling directly if you do not hear from them in the next couple of days.    Imaging scheduling  -[facility] [phone] Clinics and Surgery Center [facility] ([facility]), [facility]ics, including [facility]  -[facility] [phone] [facility], [facility] Clinics including [facility], [place], and [place]  -[facility] [phone] [facility], [facility], [facility], [facility] clinics, including [place], [facility], and [facility]  -[facility] [phone] [facility], [facility], [facility] Clinics, including [place], [facility], and [place]     #. Continue current physical therapy    #. Schedule Spine Surgery -   [place]     #. Can consider Occupational Medicine referral either through our clinic or your primary care physician.
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
## 2024-01-12 NOTE — TELEPHONE ENCOUNTER
SPINE PATIENTS - NEW PROTOCOL PREVISIT    RECORDS RECEIVED FROM: Internal   REASON FOR VISIT: discuss spine surgery    Date of Appt: 02/16/2024   NOTES (FOR ALL VISITS) STATUS DETAILS   OFFICE NOTE from referring provider Internal 01/12/2024 Dr Amin [facility]    OFFICE NOTE from other specialist Internal 08/25/2023 Rell Nugent [facility]   08/14/2023 Dr Borjas [facility] urgent care   DISCHARGE SUMMARY from hospital N/A    DISCHARGE REPORT from ER N/A    OPERATIVE REPORT N/A    EMG REPORT N/A    MEDICATION LIST N/A    IMAGING  (FOR ALL VISITS)     MRI (HEAD, NECK, SPINE) Internal 01/26/2024 lumbar spine  08/31/2023 lumbar spine   XRAY (SPINE) *NEUROSURGERY* N/A    CT (HEAD, NECK, SPINE) N/A

## 2024-01-24 ENCOUNTER — DOCUMENTATION ONLY (OUTPATIENT)
Dept: NEUROSURGERY | Facility: CLINIC | Age: 59
End: 2024-01-24
Payer: COMMERCIAL

## 2024-01-24 NOTE — PROGRESS NOTES
Faxed  Physical therapy progress notes  January 24, 2024 to fax number [phone] and HIM    Right Fax confirmed at 4:20 PM    Oumou Nunez

## 2024-01-26 ENCOUNTER — TELEPHONE (OUTPATIENT)
Dept: NEUROLOGY | Facility: CLINIC | Age: 59
End: 2024-01-26
Payer: COMMERCIAL

## 2024-01-26 ENCOUNTER — HOSPITAL ENCOUNTER (OUTPATIENT)
Dept: MRI IMAGING | Facility: CLINIC | Age: 59
Discharge: HOME | End: 2024-01-26
Attending: PHYSICAL MEDICINE & REHABILITATION | Admitting: PHYSICAL MEDICINE & REHABILITATION
Payer: OTHER MISCELLANEOUS

## 2024-01-26 DIAGNOSIS — M54.16 LUMBAR RADICULOPATHY: ICD-10-CM

## 2024-01-26 DIAGNOSIS — M54.42 ACUTE BILATERAL LOW BACK PAIN WITH BILATERAL SCIATICA: ICD-10-CM

## 2024-01-26 DIAGNOSIS — M54.41 ACUTE BILATERAL LOW BACK PAIN WITH BILATERAL SCIATICA: ICD-10-CM

## 2024-01-26 PROCEDURE — 72148 MRI LUMBAR SPINE W/O DYE: CPT

## 2024-01-26 NOTE — TELEPHONE ENCOUNTER
Ryan Amin MD  [facility] Neurology Adult [place]  Just letting you know your MRI does actually show some improvement in the bulged disc,  which is reassuring. No changes to our plan, from my standpoint, and we will continue as planned with the spine surgery appointment with Dr. Real given that you have had ongoing symptoms.    Thanks,  Ryan Amin MD  Department of Physical Medicine & Rehabilitation

## 2024-01-30 NOTE — TELEPHONE ENCOUNTER
Writer read results from Dr Meléndez note in previous encounter.  Writer confirmed upcoming appointment in Feb for the patient with Dr Real.  Patient understood and writer ended the call.  SALLY Montes.YEVGENIY ([facility])

## 2024-02-02 ENCOUNTER — DOCUMENTATION ONLY (OUTPATIENT)
Dept: NEUROSURGERY | Facility: CLINIC | Age: 59
End: 2024-02-02
Payer: COMMERCIAL

## 2024-02-02 NOTE — PROGRESS NOTES
Faxed  Physical therapy plan of care  February 2, 2024 to fax number [phone] and HIM    Right Fax confirmed at 2:27 PM    Oumou Nunez

## 2024-02-16 ENCOUNTER — OFFICE VISIT (OUTPATIENT)
Dept: NEUROSURGERY | Facility: CLINIC | Age: 59
End: 2024-02-16
Payer: OTHER MISCELLANEOUS

## 2024-02-16 ENCOUNTER — PRE VISIT (OUTPATIENT)
Dept: NEUROSURGERY | Facility: CLINIC | Age: 59
End: 2024-02-16
Payer: COMMERCIAL

## 2024-02-16 ENCOUNTER — ANCILLARY PROCEDURE (OUTPATIENT)
Dept: GENERAL RADIOLOGY | Facility: CLINIC | Age: 59
End: 2024-02-16
Attending: ORTHOPAEDIC SURGERY
Payer: OTHER MISCELLANEOUS

## 2024-02-16 VITALS
HEART RATE: 93 BPM | DIASTOLIC BLOOD PRESSURE: 100 MMHG | SYSTOLIC BLOOD PRESSURE: 161 MMHG | HEIGHT: 69 IN | WEIGHT: 230 LBS | BODY MASS INDEX: 34.07 KG/M2

## 2024-02-16 DIAGNOSIS — M54.16 LUMBAR RADICULITIS: ICD-10-CM

## 2024-02-16 DIAGNOSIS — M51.16 LUMBAR DISC HERNIATION WITH RADICULOPATHY: Primary | ICD-10-CM

## 2024-02-16 DIAGNOSIS — M54.16 LUMBAR RADICULOPATHY: ICD-10-CM

## 2024-02-16 PROCEDURE — 99204 OFFICE O/P NEW MOD 45 MIN: CPT | Performed by: ORTHOPAEDIC SURGERY

## 2024-02-16 PROCEDURE — 72110 X-RAY EXAM L-2 SPINE 4/>VWS: CPT | Performed by: RADIOLOGY

## 2024-02-16 ASSESSMENT — PAIN SCALES - GENERAL: PAINLEVEL: SEVERE PAIN (6)
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
## 2024-02-16 NOTE — PROGRESS NOTES
Spine Surgery Consultation    REASON FOR CONSULTATION: Consult       REFERRING PHYSICIAN: Dr. Amin    HISTORY OF PRESENT ILLNESS:     All questions and concerns were answered to the patient's apparent satisfaction before leaving the clinic. We used the patient's imaging, diagrams, models to explain the pathophysiology of their disease as well as surgical and non-surgical treatment options. The patient was also seen by Dr. Real who formulated and is in agreement the above plan.
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
## 2024-02-16 NOTE — LETTER
February 16, 2024      Reece ALVARADO Felix  [address] [facility] [address]        To Whom It May Concern,     I saw Reece in clinic today. He can return to work with the following restrictions:    - No lifting more than 20lbs  - No repetitive bending and/or twisting  - Must be able to take frequent breaks to walk and should not sit any longer than 30 minutes    at a time.         Sincerely,        Salas Real MD

## 2024-02-23 ENCOUNTER — TELEPHONE (OUTPATIENT)
Dept: PHYSICAL MEDICINE AND REHAB | Facility: CLINIC | Age: 59
End: 2024-02-23
Payer: COMMERCIAL

## 2024-02-23 DIAGNOSIS — M54.16 LUMBAR RADICULOPATHY: ICD-10-CM

## 2024-02-23 RX ORDER — DIAZEPAM 5 MG
5-10 TABLET ORAL
Qty: 2 TABLET | Refills: 0 | Status: SHIPPED | OUTPATIENT
Start: 2024-02-23

## 2024-02-23 NOTE — TELEPHONE ENCOUNTER
Message left for the patient to call back to get his injection scheduled with Dr. Amin.     Irene Yadav  Surgery Scheduling Coordinator  Ph: [phone]

## 2024-02-26 ENCOUNTER — TELEPHONE (OUTPATIENT)
Dept: PHYSICAL MEDICINE AND REHAB | Facility: CLINIC | Age: 59
End: 2024-02-26
Payer: COMMERCIAL

## 2024-02-26 PROBLEM — M54.16 LUMBAR RADICULOPATHY: Status: ACTIVE | Noted: 2023-10-27

## 2024-02-26 NOTE — TELEPHONE ENCOUNTER
Patient is scheduled for surgery with Dr. Amin    Spoke with: patient    Date of Surgery: 3/29/24    Location: [facility]    Informed patient they will need an adult  yes    Additional comments: Patient is aware of date and time of the procedure.        Aminata Henderson MA on 2/26/2024 at 9:44 AM

## 2024-03-29 ENCOUNTER — ANCILLARY PROCEDURE (OUTPATIENT)
Dept: GENERAL RADIOLOGY | Facility: CLINIC | Age: 59
End: 2024-03-29
Attending: PHYSICAL MEDICINE & REHABILITATION
Payer: OTHER MISCELLANEOUS

## 2024-03-29 ENCOUNTER — HOSPITAL ENCOUNTER (OUTPATIENT)
Facility: AMBULATORY SURGERY CENTER | Age: 59
Discharge: HOME | End: 2024-03-29
Attending: PHYSICAL MEDICINE & REHABILITATION | Admitting: PHYSICAL MEDICINE & REHABILITATION
Payer: OTHER MISCELLANEOUS

## 2024-03-29 VITALS
OXYGEN SATURATION: 98 % | RESPIRATION RATE: 18 BRPM | SYSTOLIC BLOOD PRESSURE: 158 MMHG | DIASTOLIC BLOOD PRESSURE: 99 MMHG

## 2024-03-29 DIAGNOSIS — R52 PAIN: ICD-10-CM

## 2024-03-29 PROCEDURE — 64484 NJX AA&/STRD TFRM EPI L/S EA: CPT | Mod: RT

## 2024-03-29 PROCEDURE — G8918 PT W/O PREOP ORDER IV AB PRO: HCPCS

## 2024-03-29 PROCEDURE — G8907 PT DOC NO EVENTS ON DISCHARG: HCPCS

## 2024-03-29 PROCEDURE — 64483 NJX AA&/STRD TFRM EPI L/S 1: CPT | Mod: RT

## 2024-03-29 RX ORDER — IOPAMIDOL 408 MG/ML
INJECTION, SOLUTION INTRATHECAL PRN
Status: DISCONTINUED | OUTPATIENT
Start: 2024-03-29 | End: 2024-03-29 | Stop reason: HOSPADM

## 2024-03-29 RX ORDER — DEXAMETHASONE SODIUM PHOSPHATE 10 MG/ML
INJECTION INTRAMUSCULAR; INTRAVENOUS PRN
Status: DISCONTINUED | OUTPATIENT
Start: 2024-03-29 | End: 2024-03-29 | Stop reason: HOSPADM

## 2024-03-29 RX ORDER — LIDOCAINE HYDROCHLORIDE 10 MG/ML
INJECTION, SOLUTION EPIDURAL; INFILTRATION; INTRACAUDAL; PERINEURAL PRN
Status: DISCONTINUED | OUTPATIENT
Start: 2024-03-29 | End: 2024-03-29 | Stop reason: HOSPADM

## 2024-03-29 NOTE — DISCHARGE INSTRUCTIONS
PAIN INJECTION HOME CARE INSTRUCTIONS  Activity  -Rest today  -Do not work today  -Resume normal activity tomorrow  -DO NOT shower for 24 hours  -DO NOT remove bandaid for 24 hours    Pain  -You may experience soreness at the injection site for one or two days  -You may use an ice pack for 20 minutes every 2 hours for the first 24 hours  -You may use a heating pad after the first 24 hours  -You may use Tylenol (acetaminophen) every 4 hours or other pain medicines as directed by your physician    You may experience numbness radiating into your legs or arms (depending on the procedure location). This numbness may last several hours. Until sensation returns to normal; please use caution in walking, climbing stairs, and stepping out of your vehicle, etc.    DID YOU RECEIVE STEROIDS TODAY?  Yes    Common side effects of steroids:  Not everyone will experience corticosteroid side effects. If side effects are experienced, they will gradually subside in the 7-10 day period following an injection. Most common side effects include:  -Flushed face and/or chest  -Feeling of warmth, particularly in the face but could be an overall feeling of warmth  -Increased blood sugar in diabetic patients  -Menstrual irregularities my occur. If taking hormone-based birth control an alternate method of birth control is recommended  -Sleep disturbances and/or mood swings are possible  -Leg cramps    Please contact us if you have:  -Severe pain  -Fever more than 101.5 degrees Fahrenheit  -Signs of infection at the injection site (redness, swelling, or drainage)    FOR PAIN CENTER PATIENTS:  If you have questions, please contact the Pain Clinic at [phone] Option #1 between the hours of 7:00 am and 3:00 pm Monday through Friday. After office hours you can contact the on call provider by dialing [phone]. If you need immediate attention, we recommend that you go to a hospital emergency room or dial [phone].      FOR PM&R PATIENTS:  For  patients seen by the PM and R service, please call [phone]. (Monday through Friday 8a-5p.  After business hours and weekends call [phone] and ask for the PM and R doctor on call). If you need to fax a pain diary to PM&R the fax number is [phone]. If you are unable to fax, uploading to [email or web address] is encouraged, then send to provider. If you have procedure scheduling questions please call [phone] Option #2.

## 2024-03-29 NOTE — OP NOTE
PROCEDURE NOTE: Lumbar Transforaminal Epidural Steroid Injection Under Fluoroscopic Guidance    PROCEDURE DATE: 3/29/2024    PATIENT NAME: Reece Washington   YOB: 1965    ATTENDING PHYSICIAN:  Ryan Amin MD   RESIDENT/FELLOW PHYSICIAN: None    PREOPERATIVE DIAGNOSIS:   Lumbar radiculopathy   POSTOPERATIVE DIAGNOSIS: same    PROCEDURE PERFORMED: Right  Lumbar Transforaminal Epidural Steroid Injection at the L5-S1 and S1-2 level(s)    FLUOROSCOPY WAS USED.    INDICATIONS FOR PROCEDURE:   Reece Washington is a 58 year old male with a clinical picture consistent with the above-mentioned diagnosis, resulting in radicular pain to the Right  lower extremity.    PROCEDURE AND FINDINGS:    He was greeted in the pre-procedure holding area. The risk, benefits and alternatives to the procedure were again reviewed with the patient and written informed consent was placed in the chart. An IV line was not placed.  A 500 mL bag of NS was not connected to the patient. He was taken to the procedure room and positioned prone on the fluoroscopy table.  Routine monitors were applied including EKG leads (with sedation only), blood pressure cuff, and pulse oximetry. Prior to the procedure a time out was completed, verifying correct patient, procedure, site, positioning, and implants and/or special equipment.     An AP fluoroscopic  film was taken to identify the L5 pedicle and the S1 superior articulating process and the S1 dorsal foramen. The skin was prepped with chlorhexidine and draped in the usual sterile fashion. The skin and subcutaneous tissue overlying the aforementioned anatomic targets were anesthetized using a 27-gauge 1-1/4 inch needle with 1% preservative-free lidocaine for a total volume of 3mls.      Then a 22-gauge 5 inch Quincke spinal needle was advanced under fluoroscopic guidance using an oblique view just inferior to the pedicle of the L5 and S1 level(s) on the Right  side(s).  Then, utilizing AP  and lateral fluoroscopic views, we confirmed the position of the needle tip to be within the neural foramen. After negative aspiration, 1 mls of Isovue-M contrast was injected under AP view at each level and confirmed adequate spread along the nerve root and in the epidural space. There was no evidence of intravascular uptake or intrathecal spread on imaging.     At this point, after negative aspiration, a total  1.5 mL volume of treatment injectate, consisting of 0.75mL Dexamethasone (10mg/mL) (15mg total dose), and  0.75 mL of 1% Lidocaine, was injected easily, per level.  The needle was then flushed with 0.5 ml of local anesthetic and removed. The needle insertion site was dressed appropriately.     Reece Washington was taken to the recovery room where he was monitored for a brief period of time. He tolerated the procedure well and was discharged home in stable condition with post procedural instructions.     Before the procedure, he reported a pain score of 5/10.   After the procedure, he reported a pain score of 0/10.      Follow-up will be a clinic visit with referring orthopedic spine surgery clinic.     COMPLICATIONS:  None    Comment(s):  None

## 2024-04-09 ENCOUNTER — TELEPHONE (OUTPATIENT)
Dept: NEUROSURGERY | Facility: CLINIC | Age: 59
End: 2024-04-09
Payer: COMMERCIAL

## 2024-04-09 NOTE — TELEPHONE ENCOUNTER
Left Voicemail (1st Attempt) and Sent [employer] (1st Attempt) for the patient to call back and schedule the following:    Appointment type: Return Telephone(per Dr. Real)  Provider: Dr. Real  Return date: 4/19/2024  Specialty phone number: [phone]  Additional appointment(s) needed:   Additonal Notes:     Attempted to schedule a telephone visit with Dr. Real on 4/19/2024. Also sent a UReserv message.    Connie LAZO/Yojana Procedure    [facility]   Neurology, NeuroSurgery, NeuroPsychology, Pain Management and Cardiology Specialties  Medical/Surgical Adult Specialties

## 2024-04-09 NOTE — TELEPHONE ENCOUNTER
----- Message from Theresa Murillo RN sent at 4/8/2024  4:51 PM CDT -----  Regarding: Follow up phone visit  Could you please reach out and get a Dr Real phone visit scheduled for this pt on 4/19? He had an AUTUMN completed on 3/29 and Farhan wanted to meet via phone to see how it went.    Thanks Connie!    Theresa

## 2024-04-10 NOTE — TELEPHONE ENCOUNTER
Left Voicemail (2nd Attempt) and Sent Mychart (2nd Attempt) for the patient to call back and schedule the following:    Appointment type: Telephone Return  Provider: Dr. Real  Return date: 4/19/2024  Specialty phone number:[phone]  Additional appointment(s) needed:   Additonal Notes:     Attempted to schedule a telephone visit with Dr. Real on 4/19/2024(per Dr. Real). Left voicemail and sent MyChart message.    Connie LAZO/Yojana Procedure    [facility]   Neurology, NeuroSurgery, NeuroPsychology, Pain Management and Cardiology Specialties  Medical/Surgical Adult Specialties

## 2024-04-22 ENCOUNTER — VIRTUAL VISIT (OUTPATIENT)
Dept: ORTHOPEDICS | Facility: CLINIC | Age: 59
End: 2024-04-22
Payer: OTHER MISCELLANEOUS

## 2024-04-22 ENCOUNTER — TELEPHONE (OUTPATIENT)
Dept: NEUROSURGERY | Facility: CLINIC | Age: 59
End: 2024-04-22
Payer: COMMERCIAL

## 2024-04-22 VITALS — WEIGHT: 225 LBS | BODY MASS INDEX: 33.33 KG/M2 | HEIGHT: 69 IN

## 2024-04-22 DIAGNOSIS — M54.16 LUMBAR RADICULOPATHY: Primary | ICD-10-CM

## 2024-04-22 PROCEDURE — 99213 OFFICE O/P EST LOW 20 MIN: CPT | Mod: 93 | Performed by: ORTHOPAEDIC SURGERY

## 2024-04-22 ASSESSMENT — PAIN SCALES - GENERAL: PAINLEVEL: MODERATE PAIN (5)

## 2024-04-22 NOTE — TELEPHONE ENCOUNTER
----- Message from Merlin Garibay ATC sent at 4/22/2024  2:06 PM CDT -----  Could you please reach out to this patient to assist them with scheduling their EMG test, per Dr. Real, and then a telephone return visit next available clinic after the EMG?    Thank you,  Merlin  ----- Message -----  From: Jen Rodríguez RN  Sent: 4/22/2024   1:57 PM CDT  To: Merlin Garibay ATC      ----- Message -----  From: Salas Real MD  Sent: 4/22/2024   1:35 PM CDT  To: Jen Rodríguez RN    Can you order a RLE EMG for barry and then phone f/unit(s) after to discuss the result?  Thank you

## 2024-04-22 NOTE — TELEPHONE ENCOUNTER
Spoke with pt to schedule EMG. EMG order needed to be extended to schedule outside of [facility]. Messaged Merlin to get order extended. Pt declined to schedule at [facility]. MyC sent with contact info per pt request

## 2024-04-22 NOTE — LETTER
4/22/2024         RE: Reece Washington  [address] [facility] [address]        Dear Colleague,    Thank you for referring your patient, Reece Washington, to the [facility] ORTHOPEDIC [facility] [place]. Please see a copy of my visit note below.    Virtual Visit Details    Type of service:  Telephone Visit   Phone call duration: 13 minutes   Originating Location (pt. Location): Home    Distant Location (provider location):  On-site    Diagnosis: Lumbar Radiculopathy    Merlin says the injection helped somewhat.  He still has trouble sitting and driving.  He still feels bothered by it, just not enough to want a surgery.    We discussed also getting an EMG.  The EMG might help us be more confident that this is what is going on.  The disc herniation is small and I am not totally confident it is the cause of his symptoms.  The EMG would help us be more confident that there are no other causes.  I would like to follow up after the EMG is done.        Sincerely,        Salas Real MD

## 2024-04-22 NOTE — NURSING NOTE
Is the patient currently in the state of [place]? YES    Visit mode:TELEPHONE    If the visit is dropped, the patient can be reconnected by: TELEPHONE VISIT: Phone number: [phone]    Will anyone else be joining the visit? NO  (If patient encounters technical issues they should call [phone]    How would you like to obtain your AVS? MyChart    Are changes needed to the allergy or medication list? No    Are refills needed on medications prescribed by this physician? NO    Reason for visit: RODRICK BERRIOS

## 2024-04-22 NOTE — PROGRESS NOTES
Virtual Visit Details    Type of service:  Telephone Visit   Phone call duration: 13 minutes   Originating Location (pt. Location): Home    Distant Location (provider location):  On-site    Diagnosis: Lumbar Radiculopathy    Merlin says the injection helped somewhat.  He still has trouble sitting and driving.  He still feels bothered by it, just not enough to want a surgery.    We discussed also getting an EMG.  The EMG might help us be more confident that this is what is going on.  The disc herniation is small and I am not totally confident it is the cause of his symptoms.  The EMG would help us be more confident that there are no other causes.  I would like to follow up after the EMG is done.    CTM

## 2024-04-24 NOTE — TELEPHONE ENCOUNTER
Patient confirmed scheduled appointment:  Date: 7/22/24  Time: 1:00pm  Visit type: Telephone visit return  Provider: Dr. Real

## 2024-06-27 ENCOUNTER — TELEPHONE (OUTPATIENT)
Dept: NEUROSURGERY | Facility: CLINIC | Age: 59
End: 2024-06-27
Payer: COMMERCIAL

## 2024-07-18 ENCOUNTER — OFFICE VISIT (OUTPATIENT)
Dept: NEUROLOGY | Facility: CLINIC | Age: 59
End: 2024-07-18
Attending: ORTHOPAEDIC SURGERY
Payer: OTHER MISCELLANEOUS

## 2024-07-18 DIAGNOSIS — M54.16 LUMBAR RADICULOPATHY: ICD-10-CM

## 2024-07-18 PROCEDURE — 95886 MUSC TEST DONE W/N TEST COMP: CPT | Mod: RT | Performed by: PSYCHIATRY & NEUROLOGY

## 2024-07-18 PROCEDURE — 95908 NRV CNDJ TST 3-4 STUDIES: CPT | Performed by: PSYCHIATRY & NEUROLOGY

## 2024-07-18 NOTE — PROCEDURES
[facility]    Formerly [facility]  [address], [address]  [place] [address]  Tel: [phone]  Fax: [phone]          Full Name: Reece Washington Gender: Male  Patient ID: [id number] YOB: 1965      Visit Date: 7/18/2024 09:29  Age: 59 Years 0 Months Old  Interpreted By: Marcelino Rodríguez MD   Ref Dr.: Salas Real MD  Tech:    Height: 5 feet 9 inch  Reason for referral: Evaluate right lower. c/o pain in low back/right leg for a year.       Motor NCS      Nerve / Sites Lat Amp Dist Erick    ms mV cm m/s   R Peroneal - EDB      Ankle 3.70 4.1 8       Fib head 11.04 4.0 31.5 43      Pop fossa 13.33 4.0 11 48   R Tibial - AH      Ankle 3.70 4.0 8       Pop fossa 13.80 3.8 40 40       F  Wave      Nerve Fmin    ms   R Tibial - AH 55.00       Sensory NCS      Nerve / Sites Onset Lat Pk Lat Amp.2-3 Dist Erick    ms ms  V cm m/s   R Sural - Ankle (Calf)      Calf 2.71 3.44 15.9 14 52   R Superficial peroneal - Ankle      Lat leg 3.02 3.91 7.5 12 40       EMG Summary Table     Spontaneous MUAP Rcmt Note   Muscle Fib PSW Fasc IA # Amp Dur PPP Rate Type   R. Gluteus medius None None None N N N N N N N   R. Gluteus martha None None None N N N N N N N   R. L3 paraspinal None None None N N N N N N N   R. L4 paraspinal Occ Occ None N N N N N N N   R. L5 paraspinal Occ Occ None N N N N N N N   R. S1 paraspinal Occ Occ None N N N N N N N   R. Adductor josy None None None N N N N N N N   R. Quadriceps None None None N N N N N N N   R. Tibialis anterior None None None Incr N N N N N N   R. Peroneus longus None None None Incr N N N N N N   R. Gastrocnemius (Medial head) None None None N N N N N N N   R. Gastrocnemius (Lateral head) None None None N N N N N N N   R. Tibialis posterior None None None N N N N N N N       SUMMARY  Nerve conduction and EMG study of right lower extremity shows:    Normal right peroneal distal motor latency, amplitude  and conduction velocity.  Normal right tibial distal motor latency, amplitude and conduction velocity.  Normal right sural and superficial peroneal sensory SNAP.  Monopolar needle exam as above      CLINICAL INTERPRETATION:  This is an abnormal nerve conduction and EMG study.  The study does not show any evidence of neuropathy.  The needle study shows mild spontaneous activity in multiple muscle groups (right L4/L5/S1).  Given normal nerve conduction studies this could represent a mildly active right L4/L5/S1 radiculopathy.  Further clinical correlation is needed.     Marcelino Rodríguez MD  Neurologist  [facility] Neurology [facility]  Tel:- [phone]

## 2024-07-18 NOTE — LETTER
7/18/2024      Reeec Washington  [address] [address]      Dear Colleague,    Thank you for referring your patient, Reece Washington, to the [facility] NEUROLOGY CLINIC [place]. Please see a copy of my visit note below.    See procedure note.       Again, thank you for allowing me to participate in the care of your patient.        Sincerely,        Marcelino Rodríguez MD

## 2024-08-01 ENCOUNTER — TELEPHONE (OUTPATIENT)
Dept: ORTHOPEDICS | Facility: CLINIC | Age: 59
End: 2024-08-01
Payer: COMMERCIAL

## 2024-08-01 DIAGNOSIS — M48.061 LUMBAR SPINAL STENOSIS: ICD-10-CM

## 2024-08-01 DIAGNOSIS — M54.16 LUMBAR RADICULOPATHY: Primary | ICD-10-CM

## 2024-08-01 DIAGNOSIS — M54.50 LUMBAR PAIN: ICD-10-CM

## 2024-08-01 NOTE — TELEPHONE ENCOUNTER
Patient Contacted and scheduled the following:  Appointment type: MRI    Pt needs Inj scheduled. Writer called radiology and tried to schedule pt in [place] but,  said [place] does not do injections that is active for pt. Writer tried to call  imaging where pt was last seen to get scheduled and imaging number is not in service. Writer told pt that I will call back after calling to see who does injection to get pt scheduled.

## 2024-08-02 ENCOUNTER — TELEPHONE (OUTPATIENT)
Dept: ORTHOPEDICS | Facility: CLINIC | Age: 59
End: 2024-08-02
Payer: COMMERCIAL

## 2024-08-02 ENCOUNTER — TELEPHONE (OUTPATIENT)
Dept: PHYSICAL MEDICINE AND REHAB | Facility: CLINIC | Age: 59
End: 2024-08-02
Payer: COMMERCIAL

## 2024-08-02 NOTE — TELEPHONE ENCOUNTER
Patient Contacted to get pt scheduled for: XR Nerve Root Block Lumbar/Sacral Addl Level   Patient have pain management referral and writer called imaging and imaging told writer they could not do this procedure and told writer to call pain management. Writer called pain management and pain management said they cannot do injection either. Pain management gave writer number to [facility] located in [place]. Spine Center will call back to see if we can get patient scheduled for appt.

## 2024-08-02 NOTE — TELEPHONE ENCOUNTER
Message received from [facility]  asking if providers here will do Right S1 SNRI diagnostic only; no steroid.   Order placed for this so patient can be scheduled here. Asked  to contact patient to schedule with Dr. Sherwood.

## 2024-08-05 ENCOUNTER — TELEPHONE (OUTPATIENT)
Dept: ORTHOPEDICS | Facility: CLINIC | Age: 59
End: 2024-08-05
Payer: COMMERCIAL

## 2024-08-05 NOTE — TELEPHONE ENCOUNTER
Left Voicemail (1st Attempt) and Sent Rupeetalkhart (1st Attempt) for the patient to call back and schedule the following:    Injection at [facility] and F/U with Dr. Real. Left [facility] number on pt vm and told pt to call [facility] back for follow up.

## 2024-08-05 NOTE — TELEPHONE ENCOUNTER
Left Voicemail (2nd Attempt) and Sent Mychart (2nd Attempt) for the patient to call back and schedule the following:    Appointment type: AUTUMN Inj Lumbosacral One Level Right @ [facility]/ Follow Up  Provider: Dr. Real  Return date: Next Available  MAX attempts made to schedule patient

## 2024-08-06 ENCOUNTER — TELEPHONE (OUTPATIENT)
Dept: PHYSICAL MEDICINE AND REHAB | Facility: CLINIC | Age: 59
End: 2024-08-06
Payer: COMMERCIAL

## 2024-08-06 DIAGNOSIS — M54.17 LUMBOSACRAL RADICULOPATHY: Primary | ICD-10-CM

## 2024-08-06 NOTE — TELEPHONE ENCOUNTER
Called patient to schedule procedure with Dr. Amin    Date of Procedure: 9/27/24    Arrival time given: Yes: Arrival Time 12pm       Procedure Location: [facility] [place]     Verified Location with Patient:  Yes  Address provided to the patient     Pre-op H&P Required:  No: Local anesthesia        Post-Op/Follow Up Appt:  Not Indicated in Request      Informed patient they will need a  to drive them home:  Yes    Patients : Spouse     Patient is aware that pre-op RN from the procedure center will call 2-3 days prior to scheduled procedure to confirm arrival time and review any instructions:  Yes       Additional Comments: N/A        Aminata Henderson MA on 8/6/2024 at 4:13 PM      P: [phone]*

## 2024-08-07 NOTE — TELEPHONE ENCOUNTER
Left Voicemail (1st Attempt) and Sent Arant[employer]hart (1st Attempt) for the patient to call back and schedule the following:    Appointment type: Return Adult Surg  Provider: Dr. Amin  Return date: 10/18/2024  Specialty phone number: [phone]  Additional appointment(s) needed:   Additonal Notes:     Attempted to schedule a follow up with Dr. Amin after procedure on 9/27/2024, to discuss results.    Also sent a [employer] message.    Connie LAZO/Yojana Procedure    [facility]   Neurology, NeuroSurgery, NeuroPsychology, Pain Management and Cardiology Specialties  Medical/Surgical Adult Specialties

## 2024-08-13 ENCOUNTER — TELEPHONE (OUTPATIENT)
Dept: ORTHOPEDICS | Facility: CLINIC | Age: 59
End: 2024-08-13
Payer: COMMERCIAL

## 2024-08-15 ENCOUNTER — HOSPITAL ENCOUNTER (OUTPATIENT)
Dept: MRI IMAGING | Facility: CLINIC | Age: 59
Discharge: HOME | End: 2024-08-15
Attending: ORTHOPAEDIC SURGERY | Admitting: ORTHOPAEDIC SURGERY
Payer: OTHER MISCELLANEOUS

## 2024-08-15 DIAGNOSIS — M54.16 LUMBAR RADICULOPATHY: ICD-10-CM

## 2024-08-15 DIAGNOSIS — M54.50 LUMBAR PAIN: ICD-10-CM

## 2024-08-15 DIAGNOSIS — M48.061 LUMBAR SPINAL STENOSIS: ICD-10-CM

## 2024-08-15 PROCEDURE — 72148 MRI LUMBAR SPINE W/O DYE: CPT

## 2024-09-27 ENCOUNTER — ANCILLARY PROCEDURE (OUTPATIENT)
Dept: GENERAL RADIOLOGY | Facility: CLINIC | Age: 59
End: 2024-09-27
Attending: PHYSICAL MEDICINE & REHABILITATION
Payer: COMMERCIAL

## 2024-09-27 ENCOUNTER — HOSPITAL ENCOUNTER (OUTPATIENT)
Facility: AMBULATORY SURGERY CENTER | Age: 59
Discharge: HOME | End: 2024-09-27
Attending: PHYSICAL MEDICINE & REHABILITATION | Admitting: PHYSICAL MEDICINE & REHABILITATION
Payer: OTHER MISCELLANEOUS

## 2024-09-27 VITALS
TEMPERATURE: 97 F | DIASTOLIC BLOOD PRESSURE: 98 MMHG | RESPIRATION RATE: 16 BRPM | OXYGEN SATURATION: 98 % | SYSTOLIC BLOOD PRESSURE: 167 MMHG

## 2024-09-27 DIAGNOSIS — R52 PAIN: ICD-10-CM

## 2024-09-27 PROCEDURE — 64483 NJX AA&/STRD TFRM EPI L/S 1: CPT

## 2024-09-27 PROCEDURE — G8907 PT DOC NO EVENTS ON DISCHARG: HCPCS

## 2024-09-27 PROCEDURE — G8918 PT W/O PREOP ORDER IV AB PRO: HCPCS

## 2024-09-27 RX ORDER — DEXAMETHASONE SODIUM PHOSPHATE 10 MG/ML
INJECTION INTRAMUSCULAR; INTRAVENOUS PRN
Status: DISCONTINUED | OUTPATIENT
Start: 2024-09-27 | End: 2024-09-27 | Stop reason: HOSPADM

## 2024-09-27 RX ORDER — LIDOCAINE HYDROCHLORIDE 10 MG/ML
INJECTION, SOLUTION EPIDURAL; INFILTRATION; INTRACAUDAL; PERINEURAL PRN
Status: DISCONTINUED | OUTPATIENT
Start: 2024-09-27 | End: 2024-09-27 | Stop reason: HOSPADM

## 2024-09-27 RX ORDER — IOPAMIDOL 408 MG/ML
INJECTION, SOLUTION INTRATHECAL PRN
Status: DISCONTINUED | OUTPATIENT
Start: 2024-09-27 | End: 2024-09-27 | Stop reason: HOSPADM

## 2024-09-27 NOTE — DISCHARGE INSTRUCTIONS
PAIN INJECTION HOME CARE INSTRUCTIONS  Activity  -Rest today  -Do not work today  -Resume normal activity tomorrow  -DO NOT shower for 24 hours  -DO NOT remove bandaid for 24 hours    Pain  -You may experience soreness at the injection site for one or two days  -You may use an ice pack for 20 minutes every 2 hours for the first 24 hours  -You may use a heating pad after the first 24 hours  -You may use Tylenol (acetaminophen) every 4 hours or other pain medicines as directed by your physician    You may experience numbness radiating into your legs or arms (depending on the procedure location). This numbness may last several hours. Until sensation returns to normal; please use caution in walking, climbing stairs, and stepping out of your vehicle, etc.    DID YOU RECEIVE SEDATION TODAY?  No    Safety  Sedation medicine, if given, may remain active for many hours. It is important for the next 24 hours that you do not:  -Drive a car  -Operate machines or power tools  -Consume alcohol, including beer  -Sign any important papers or legal documents    DID YOU RECEIVE STEROIDS TODAY?  Yes    Common side effects of steroids:  Not everyone will experience corticosteroid side effects. If side effects are experienced, they will gradually subside in the 7-10 day period following an injection. Most common side effects include:  -Flushed face and/or chest  -Feeling of warmth, particularly in the face but could be an overall feeling of warmth  -Increased blood sugar in diabetic patients  -Menstrual irregularities my occur. If taking hormone-based birth control an alternate method of birth control is recommended  -Sleep disturbances and/or mood swings are possible  -Leg cramps    Please contact us if you have:  -Severe pain  -Fever more than 101.5 degrees Fahrenheit  -Signs of infection at the injection site (redness, swelling, or drainage)    FOR PAIN CENTER PATIENTS:  If you have questions, please contact the Pain Clinic at  [phone] Option #1 between the hours of 7:00 am and 3:00 pm Monday through Friday. After office hours you can contact the on call provider by dialing [phone]. If you need immediate attention, we recommend that you go to a hospital emergency room or dial [phone].      FOR PM&R PATIENTS:  For patients seen by the PM and R service, please call [phone]. (Monday through Friday 8a-5p.  After business hours and weekends call [phone] and ask for the PM and R doctor on call). If you need to fax a pain diary to PM&R the fax number is [phone]. If you are unable to fax, uploading to [employer] is encouraged, then send to provider. If you have procedure scheduling questions please call [phone] Option #2.

## 2024-09-27 NOTE — OP NOTE
PROCEDURE NOTE: Lumbosacral Selective Nerve Root Block      PROCEDURE DATE: 9/27/2024    PATIENT NAME: Reece Washington  YOB: 1965    ATTENDING PHYSICIAN: Ryan Amin MD   RESIDENT/FELLOW PHYSICIAN: Sandra Dill MD, PGY-IV     PREOPERATIVE DIAGNOSIS:   Lumbar radiculopathy   POSTOPERATIVE DIAGNOSIS: same    PROCEDURE PERFORMED: Lumbosacral Selective Nerve Root Block at the S1-2 level on the Right    FLUOROSCOPY WAS USED.    INDICATIONS FOR PROCEDURE:   Reece Washington is a 59 year old male with a clinical picture consistent with the above-mentioned diagnosis, resulting in radicular pain to the right lower extremity.    PROCEDURE AND FINDINGS:    He was greeted in the pre-procedure holding area. The risk, benefits and alternatives to the procedure were again reviewed with the patient and written informed consent was placed in the chart. An IV line was not placed.  A 500 mL bag of NS was not connected to the patient. he was taken to the procedure room and positioned prone on the fluoroscopy table.  Routine monitors were applied including EKG leads (with sedation), blood pressure cuff, and pulse oximetry. Prior to the procedure a time out was completed, verifying correct patient, procedure, site, positioning, and implants and/or special equipment.     An AP fluoroscopic  film was taken to identify the S1 pedicle and S1 dorsal foramen on the right. The skin was prepped with chlorhexidine and draped in the usual sterile fashion. The skin and subcutaneous tissue overlying the aforementioned anatomic targets were anesthetized using a 27-gauge 1-1/4 inch needle with 1% preservative-free lidocaine for a total volume of 2mls.      Then a 22-gauge 3.5 inch Quincke spinal needle was advanced under fluoroscopic guidance using an oblique view just inferior to the pedicle of the S1 level(s) on the right side(s).  Then, utilizing AP and lateral fluoroscopic views, we confirmed the position of the needle tip to  be within the neural foramen. After negative aspiration, 1 mls of Isovue-M contrast was injected under AP view at each level and confirmed adequate spread along the nerve root. There was no evidence of intravascular uptake or intrathecal spread on imaging.     At this point, after negative aspiration, a total 2mL volume of treatment injectate, consisting of 1mL Dexamethasone (10mg/mL), and 1mL of 1% Lidocaine, was injected easily on the right.  The needle was then flushed with 0.5 ml of local anesthetic and removed. The needle insertion site was dressed appropriately.     Reece was taken to the recovery room where he was monitored for a brief period of time. He tolerated the procedure well and was discharged home in stable condition with post procedural instructions.    Before the procedure, he reported a pain score of 5/10.   After the procedure, he reported a pain score of 2/10.       Follow-up will be in orthopedic clinic    COMPLICATIONS:  None    Comment(s): Patient was counseled extensively on rationale providing right S1-II nerve root block.  Instruction was provided for him to evaluate his diagnostic response to local anesthetic.  After discussion with patient and family, we also agreed to add corticosteroid to see if this provides any other therapeutic benefit that may be longer lasting.

## 2024-10-18 ENCOUNTER — OFFICE VISIT (OUTPATIENT)
Dept: NEUROSURGERY | Facility: CLINIC | Age: 59
End: 2024-10-18
Payer: OTHER MISCELLANEOUS

## 2024-10-18 VITALS
HEART RATE: 90 BPM | SYSTOLIC BLOOD PRESSURE: 162 MMHG | BODY MASS INDEX: 33.33 KG/M2 | WEIGHT: 225 LBS | HEIGHT: 69 IN | DIASTOLIC BLOOD PRESSURE: 101 MMHG

## 2024-10-18 DIAGNOSIS — M51.16 LUMBAR DISC HERNIATION WITH RADICULOPATHY: Primary | ICD-10-CM

## 2024-10-18 PROCEDURE — 99214 OFFICE O/P EST MOD 30 MIN: CPT | Mod: GC | Performed by: ORTHOPAEDIC SURGERY

## 2024-10-18 ASSESSMENT — PAIN SCALES - GENERAL: PAINLEVEL: SEVERE PAIN (6)
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
## 2024-10-18 NOTE — LETTER
10/18/2024    Reece Washington   1965        To Whom it May Concern;         I saw Reece in clinic today. He can return to work with the following restrictions:     - No lifting more than 20lbs  - No repetitive bending and/or twisting  - Must be able to take frequent breaks to walk and should not sit any longer than 30 minutes at a time.  - Unable to drive for longer than 30 minutes at a time    These restrictions will need to be implemented until he completes a functional capacity evaluation. We will also be referring him to the pain clinic for further evaluation. No further follow up with me has been made.         Salas Real MD

## 2025-02-22 ENCOUNTER — HEALTH MAINTENANCE LETTER (OUTPATIENT)
Age: 60
End: 2025-02-22

## (undated) DEVICE — TRAY PAIN INJECTION 97A 640

## (undated) DEVICE — NDL SPINAL 22GA 3.5" QUINCKE 405181

## (undated) DEVICE — PREP CHLORAPREP W/ORANGE TINT 10.5ML 930715

## (undated) DEVICE — GLOVE BIOGEL PI ULTRATOUCH G SZ 7.0 42170

## (undated) DEVICE — SOL WATER IRRIG 1000ML BOTTLE 07139-09

## (undated) RX ORDER — FENTANYL CITRATE 50 UG/ML
INJECTION, SOLUTION INTRAMUSCULAR; INTRAVENOUS
Status: DISPENSED
Start: 2022-10-04